# Patient Record
Sex: MALE | Race: WHITE
[De-identification: names, ages, dates, MRNs, and addresses within clinical notes are randomized per-mention and may not be internally consistent; named-entity substitution may affect disease eponyms.]

---

## 2018-04-16 ENCOUNTER — HOSPITAL ENCOUNTER (OUTPATIENT)
Age: 63
End: 2018-04-16
Payer: COMMERCIAL

## 2018-04-16 DIAGNOSIS — I10: Primary | ICD-10-CM

## 2018-04-16 DIAGNOSIS — E03.9: ICD-10-CM

## 2018-04-16 DIAGNOSIS — R25.2: ICD-10-CM

## 2018-04-16 LAB
ALANINE AMINOTRANSFER ALT/SGPT: 20 U/L (ref 16–61)
ALBUMIN SERPL-MCNC: 4 G/DL (ref 3.2–5)
ALKALINE PHOSPHATASE: 110 U/L (ref 45–117)
ANION GAP: 6 (ref 5–15)
AST(SGOT): 17 U/L (ref 15–37)
BUN SERPL-MCNC: 15 MG/DL (ref 7–18)
BUN/CREAT RATIO: 15 RATIO (ref 10–20)
CALCIUM SERPL-MCNC: 8.8 MG/DL (ref 8.5–10.1)
CARBON DIOXIDE: 27 MMOL/L (ref 21–32)
CHLORIDE: 108 MMOL/L (ref 98–107)
CHOLEST SERPL-MCNC: 97 MG/DL
EST GLOM FILT RATE - AFR AMER: 97 ML/MIN (ref 60–?)
GLOBULIN: 3.7 G/DL (ref 2.2–4.2)
GLUCOSE: 91 MG/DL (ref 74–106)
POTASSIUM: 4 MMOL/L (ref 3.5–5.1)
TRIGLYCERIDES: 126 MG/DL
VITAMIN D,25 HYDROXY: 21.6 NG/ML (ref 29.95–100.01)
VLDLC SERPL-MCNC: 25 MG/DL (ref 5–40)

## 2018-04-16 PROCEDURE — 36415 COLL VENOUS BLD VENIPUNCTURE: CPT

## 2018-04-16 PROCEDURE — 80061 LIPID PANEL: CPT

## 2018-04-16 PROCEDURE — 84443 ASSAY THYROID STIM HORMONE: CPT

## 2018-04-16 PROCEDURE — 80053 COMPREHEN METABOLIC PANEL: CPT

## 2018-04-16 PROCEDURE — 82306 VITAMIN D 25 HYDROXY: CPT

## 2019-02-20 ENCOUNTER — HOSPITAL ENCOUNTER (OUTPATIENT)
Age: 64
End: 2019-02-20
Payer: MEDICARE

## 2019-02-20 DIAGNOSIS — M79.662: Primary | ICD-10-CM

## 2019-02-20 PROCEDURE — 93971 EXTREMITY STUDY: CPT

## 2019-02-21 ENCOUNTER — HOSPITAL ENCOUNTER (OUTPATIENT)
Age: 64
End: 2019-02-21
Payer: MEDICARE

## 2019-02-21 DIAGNOSIS — Z01.818: Primary | ICD-10-CM

## 2019-02-21 DIAGNOSIS — I72.4: ICD-10-CM

## 2019-02-21 LAB
ANION GAP: 5 (ref 5–15)
BUN SERPL-MCNC: 15 MG/DL (ref 7–18)
BUN/CREAT RATIO: 12.8 RATIO (ref 10–20)
CALCIUM SERPL-MCNC: 9.3 MG/DL (ref 8.5–10.1)
CARBON DIOXIDE: 28 MMOL/L (ref 21–32)
CHLORIDE: 105 MMOL/L (ref 98–107)
DEPRECATED RDW RBC: 46.9 FL (ref 35.1–43.9)
ERYTHROCYTE [DISTWIDTH] IN BLOOD: 14.4 % (ref 11.6–14.6)
EST GLOM FILT RATE - AFR AMER: 80 ML/MIN (ref 60–?)
GLUCOSE: 111 MG/DL (ref 74–106)
HCT VFR BLD AUTO: 47 % (ref 40–54)
HEMOGLOBIN: 15.8 G/DL (ref 13–16.5)
HGB BLD-MCNC: 15.8 G/DL (ref 13–16.5)
MCV RBC: 89.7 FL (ref 80–94)
MEAN CORP HGB CONC: 33.6 G/GL (ref 32–36)
MEAN PLATELET VOL.: 11.3 FL (ref 6.2–12)
PLATELET # BLD: 189 K/MM3 (ref 150–450)
PLATELET COUNT: 189 K/MM3 (ref 150–450)
POTASSIUM: 5 MMOL/L (ref 3.5–5.1)
RBC # BLD AUTO: 5.24 M/MM3 (ref 4.6–6.2)
RBC DISTRIBUTION WIDTH CV: 14.4 % (ref 11.6–14.6)
RBC DISTRIBUTION WIDTH SD: 46.9 FL (ref 35.1–43.9)
SCAN INDICATED ON CBC? Y/N: NO
WBC # BLD AUTO: 11 K/MM3 (ref 4.4–11)
WHITE BLOOD COUNT: 11 K/MM3 (ref 4.4–11)

## 2019-02-21 PROCEDURE — 36415 COLL VENOUS BLD VENIPUNCTURE: CPT

## 2019-02-21 PROCEDURE — 80048 BASIC METABOLIC PNL TOTAL CA: CPT

## 2019-02-21 PROCEDURE — 85027 COMPLETE CBC AUTOMATED: CPT

## 2019-02-21 PROCEDURE — 75635 CT ANGIO ABDOMINAL ARTERIES: CPT

## 2019-02-22 ENCOUNTER — HOSPITAL ENCOUNTER (OUTPATIENT)
Age: 64
End: 2019-02-22
Payer: MEDICARE

## 2019-02-22 DIAGNOSIS — I72.4: ICD-10-CM

## 2019-02-22 DIAGNOSIS — Z01.818: Primary | ICD-10-CM

## 2019-02-22 PROCEDURE — 93970 EXTREMITY STUDY: CPT

## 2019-04-12 ENCOUNTER — HOSPITAL ENCOUNTER (EMERGENCY)
Age: 64
Discharge: HOME | End: 2019-04-12
Payer: MEDICARE

## 2019-04-12 VITALS
OXYGEN SATURATION: 97 % | DIASTOLIC BLOOD PRESSURE: 88 MMHG | RESPIRATION RATE: 16 BRPM | TEMPERATURE: 97.7 F | SYSTOLIC BLOOD PRESSURE: 159 MMHG | HEART RATE: 59 BPM

## 2019-04-12 VITALS
SYSTOLIC BLOOD PRESSURE: 140 MMHG | DIASTOLIC BLOOD PRESSURE: 80 MMHG | HEART RATE: 85 BPM | OXYGEN SATURATION: 97 % | RESPIRATION RATE: 16 BRPM

## 2019-04-12 VITALS — BODY MASS INDEX: 30.3 KG/M2

## 2019-04-12 DIAGNOSIS — E78.00: ICD-10-CM

## 2019-04-12 DIAGNOSIS — M79.642: Primary | ICD-10-CM

## 2019-04-12 DIAGNOSIS — M79.641: ICD-10-CM

## 2019-04-12 DIAGNOSIS — I10: ICD-10-CM

## 2019-04-12 DIAGNOSIS — Z87.891: ICD-10-CM

## 2019-04-12 DIAGNOSIS — F32.9: ICD-10-CM

## 2019-04-12 DIAGNOSIS — L40.9: ICD-10-CM

## 2019-04-12 PROCEDURE — 99283 EMERGENCY DEPT VISIT LOW MDM: CPT

## 2019-04-15 ENCOUNTER — HOSPITAL ENCOUNTER (OUTPATIENT)
Age: 64
End: 2019-04-15
Payer: MEDICARE

## 2019-04-15 VITALS — BODY MASS INDEX: 30.3 KG/M2

## 2019-04-15 DIAGNOSIS — I72.4: Primary | ICD-10-CM

## 2019-04-15 DIAGNOSIS — E03.9: ICD-10-CM

## 2019-04-15 DIAGNOSIS — E78.5: ICD-10-CM

## 2019-04-15 DIAGNOSIS — I10: ICD-10-CM

## 2019-04-15 LAB
ALANINE AMINOTRANSFER ALT/SGPT: 20 U/L (ref 16–61)
ANION GAP: 8 (ref 5–15)
AST(SGOT): 15 U/L (ref 15–37)
BUN SERPL-MCNC: 16 MG/DL (ref 7–18)
BUN/CREAT RATIO: 15.8 RATIO (ref 10–20)
CALCIUM SERPL-MCNC: 8.7 MG/DL (ref 8.5–10.1)
CARBON DIOXIDE: 26 MMOL/L (ref 21–32)
CHLORIDE: 105 MMOL/L (ref 98–107)
CHOLEST SERPL-MCNC: 123 MG/DL
EST GLOM FILT RATE - AFR AMER: 95 ML/MIN (ref 60–?)
GLUCOSE: 105 MG/DL (ref 74–106)
POTASSIUM: 4 MMOL/L (ref 3.5–5.1)
T4 SERPL-MCNC: 8.4 UG/DL (ref 4.5–12.1)
TRIGLYCERIDES: 104 MG/DL
VLDLC SERPL-MCNC: 21 MG/DL (ref 5–40)

## 2019-04-15 PROCEDURE — 84436 ASSAY OF TOTAL THYROXINE: CPT

## 2019-04-15 PROCEDURE — 80048 BASIC METABOLIC PNL TOTAL CA: CPT

## 2019-04-15 PROCEDURE — 84460 ALANINE AMINO (ALT) (SGPT): CPT

## 2019-04-15 PROCEDURE — 80061 LIPID PANEL: CPT

## 2019-04-15 PROCEDURE — 36415 COLL VENOUS BLD VENIPUNCTURE: CPT

## 2019-04-15 PROCEDURE — 84450 TRANSFERASE (AST) (SGOT): CPT

## 2019-04-15 PROCEDURE — 84443 ASSAY THYROID STIM HORMONE: CPT

## 2019-06-23 ENCOUNTER — HOSPITAL ENCOUNTER (EMERGENCY)
Age: 64
Discharge: HOME | End: 2019-06-23
Payer: MEDICARE

## 2019-06-23 VITALS
OXYGEN SATURATION: 94 % | SYSTOLIC BLOOD PRESSURE: 124 MMHG | DIASTOLIC BLOOD PRESSURE: 69 MMHG | HEART RATE: 66 BPM | RESPIRATION RATE: 18 BRPM

## 2019-06-23 VITALS
SYSTOLIC BLOOD PRESSURE: 124 MMHG | RESPIRATION RATE: 18 BRPM | DIASTOLIC BLOOD PRESSURE: 73 MMHG | OXYGEN SATURATION: 95 % | HEART RATE: 69 BPM | TEMPERATURE: 97.4 F

## 2019-06-23 VITALS — BODY MASS INDEX: 30.3 KG/M2 | BODY MASS INDEX: 30.6 KG/M2

## 2019-06-23 DIAGNOSIS — E78.00: ICD-10-CM

## 2019-06-23 DIAGNOSIS — M06.9: ICD-10-CM

## 2019-06-23 DIAGNOSIS — Z79.01: ICD-10-CM

## 2019-06-23 DIAGNOSIS — I10: ICD-10-CM

## 2019-06-23 DIAGNOSIS — M79.651: Primary | ICD-10-CM

## 2019-06-23 LAB
ANION GAP: 8 (ref 5–15)
BUN SERPL-MCNC: 13 MG/DL (ref 7–18)
BUN/CREAT RATIO: 11.5 RATIO (ref 10–20)
CALCIUM SERPL-MCNC: 9.1 MG/DL (ref 8.5–10.1)
CARBON DIOXIDE: 26 MMOL/L (ref 21–32)
CHLORIDE: 105 MMOL/L (ref 98–107)
DEPRECATED RDW RBC: 43.6 FL (ref 35.1–43.9)
DIFFERENTIAL INDICATED: (no result)
ERYTHROCYTE [DISTWIDTH] IN BLOOD: 14.4 % (ref 11.6–14.6)
EST GLOM FILT RATE - AFR AMER: 84 ML/MIN (ref 60–?)
ESTIMATED CREATININE CLEARANCE: 71.53 ML/MIN
GLUCOSE: 120 MG/DL (ref 74–106)
HCT VFR BLD AUTO: 42.2 % (ref 40–54)
HEMOGLOBIN: 14.7 G/DL (ref 13–16.5)
HGB BLD-MCNC: 14.7 G/DL (ref 13–16.5)
IMMATURE GRANULOCYTES COUNT: 0.02 X10^3/UL (ref 0–0)
MCV RBC: 84.7 FL (ref 80–94)
MEAN CORP HGB CONC: 34.8 G/GL (ref 32–36)
MEAN PLATELET VOL.: 10.6 FL (ref 6.2–12)
PLATELET # BLD: 179 K/MM3 (ref 150–450)
PLATELET COUNT: 179 K/MM3 (ref 150–450)
POSITIVE COUNT: NO
POSITIVE DIFFERENTIAL: NO
POSITIVE MORPHOLOGY: NO
POTASSIUM: 4.1 MMOL/L (ref 3.5–5.1)
PROTHROMBIN TIME (PROTIME)PT.: 27.7 SECONDS (ref 11.7–14.9)
RBC # BLD AUTO: 4.98 M/MM3 (ref 4.6–6.2)
RBC DISTRIBUTION WIDTH CV: 14.4 % (ref 11.6–14.6)
RBC DISTRIBUTION WIDTH SD: 43.6 FL (ref 35.1–43.9)
WBC # BLD AUTO: 10.1 K/MM3 (ref 4.4–11)
WHITE BLOOD COUNT: 10.1 K/MM3 (ref 4.4–11)

## 2019-06-23 PROCEDURE — 73706 CT ANGIO LWR EXTR W/O&W/DYE: CPT

## 2019-06-23 PROCEDURE — A4216 STERILE WATER/SALINE, 10 ML: HCPCS

## 2019-06-23 PROCEDURE — 73552 X-RAY EXAM OF FEMUR 2/>: CPT

## 2019-06-23 PROCEDURE — 96372 THER/PROPH/DIAG INJ SC/IM: CPT

## 2019-06-23 PROCEDURE — 96374 THER/PROPH/DIAG INJ IV PUSH: CPT

## 2019-06-23 PROCEDURE — 85025 COMPLETE CBC W/AUTO DIFF WBC: CPT

## 2019-06-23 PROCEDURE — 72170 X-RAY EXAM OF PELVIS: CPT

## 2019-06-23 PROCEDURE — 99283 EMERGENCY DEPT VISIT LOW MDM: CPT

## 2019-06-23 PROCEDURE — 96375 TX/PRO/DX INJ NEW DRUG ADDON: CPT

## 2019-06-23 PROCEDURE — 80048 BASIC METABOLIC PNL TOTAL CA: CPT

## 2019-06-23 PROCEDURE — 85610 PROTHROMBIN TIME: CPT

## 2019-08-14 ENCOUNTER — HOSPITAL ENCOUNTER (OUTPATIENT)
Age: 64
End: 2019-08-14
Payer: MEDICARE

## 2019-08-14 VITALS — BODY MASS INDEX: 30.6 KG/M2

## 2019-08-14 DIAGNOSIS — M79.7: ICD-10-CM

## 2019-08-14 DIAGNOSIS — L40.8: ICD-10-CM

## 2019-08-14 DIAGNOSIS — L40.59: Primary | ICD-10-CM

## 2019-08-14 LAB
ALANINE AMINOTRANSFER ALT/SGPT: 24 U/L (ref 16–61)
ALBUMIN SERPL-MCNC: 3.8 G/DL (ref 3.2–5)
ALKALINE PHOSPHATASE: 86 U/L (ref 45–117)
ANION GAP: 6 (ref 5–15)
AST(SGOT): 19 U/L (ref 15–37)
BUN SERPL-MCNC: 18 MG/DL (ref 7–18)
BUN/CREAT RATIO: 16.4 RATIO (ref 10–20)
CALCIUM SERPL-MCNC: 9.2 MG/DL (ref 8.5–10.1)
CARBON DIOXIDE: 30 MMOL/L (ref 21–32)
CHLORIDE: 102 MMOL/L (ref 98–107)
CRP SERPL-MCNC: < 2.9 MG/L (ref 0–3)
DEPRECATED RDW RBC: 48.1 FL (ref 35.1–43.9)
ERYTHROCYTE [DISTWIDTH] IN BLOOD: 14.6 % (ref 11.6–14.6)
EST GLOM FILT RATE - AFR AMER: 86 ML/MIN (ref 60–?)
GLOBULIN: 3.5 G/DL (ref 2.2–4.2)
GLUCOSE: 129 MG/DL (ref 74–106)
HCT VFR BLD AUTO: 43.4 % (ref 40–54)
HEMOGLOBIN: 14.4 G/DL (ref 13–16.5)
HGB BLD-MCNC: 14.4 G/DL (ref 13–16.5)
IMMATURE GRANULOCYTES COUNT: 0.08 X10^3/UL (ref 0–0)
MCV RBC: 89.9 FL (ref 80–94)
MEAN CORP HGB CONC: 33.2 G/DL (ref 32–36)
MEAN PLATELET VOL.: 11.3 FL (ref 6.2–12)
NRBC FLAGGED BY ANALYZER: 0 % (ref 0–5)
PLATELET # BLD: 168 K/MM3 (ref 150–450)
PLATELET COUNT: 168 K/MM3 (ref 150–450)
POTASSIUM: 4.1 MMOL/L (ref 3.5–5.1)
RBC # BLD AUTO: 4.83 M/MM3 (ref 4.6–6.2)
RBC DISTRIBUTION WIDTH CV: 14.6 % (ref 11.6–14.6)
RBC DISTRIBUTION WIDTH SD: 48.1 FL (ref 35.1–43.9)
WBC # BLD AUTO: 11 K/MM3 (ref 4.4–11)
WHITE BLOOD COUNT: 11 K/MM3 (ref 4.4–11)

## 2019-08-14 PROCEDURE — 72170 X-RAY EXAM OF PELVIS: CPT

## 2019-08-14 PROCEDURE — 80053 COMPREHEN METABOLIC PANEL: CPT

## 2019-08-14 PROCEDURE — 86706 HEP B SURFACE ANTIBODY: CPT

## 2019-08-14 PROCEDURE — 86803 HEPATITIS C AB TEST: CPT

## 2019-08-14 PROCEDURE — 85025 COMPLETE CBC W/AUTO DIFF WBC: CPT

## 2019-08-14 PROCEDURE — 85652 RBC SED RATE AUTOMATED: CPT

## 2019-08-14 PROCEDURE — 36415 COLL VENOUS BLD VENIPUNCTURE: CPT

## 2019-08-14 PROCEDURE — 87340 HEPATITIS B SURFACE AG IA: CPT

## 2019-08-14 PROCEDURE — 86200 CCP ANTIBODY: CPT

## 2019-08-14 PROCEDURE — 86140 C-REACTIVE PROTEIN: CPT

## 2019-08-14 PROCEDURE — 86038 ANTINUCLEAR ANTIBODIES: CPT

## 2019-08-14 PROCEDURE — 81374 HLA I TYPING 1 ANTIGEN LR: CPT

## 2019-08-14 PROCEDURE — 86431 RHEUMATOID FACTOR QUANT: CPT

## 2019-08-23 ENCOUNTER — HOSPITAL ENCOUNTER (OUTPATIENT)
Age: 64
End: 2019-08-23
Payer: MEDICARE

## 2019-08-23 VITALS — BODY MASS INDEX: 30.6 KG/M2

## 2019-08-23 DIAGNOSIS — E03.9: ICD-10-CM

## 2019-08-23 DIAGNOSIS — E78.5: ICD-10-CM

## 2019-08-23 DIAGNOSIS — F32.89: ICD-10-CM

## 2019-08-23 DIAGNOSIS — L40.59: Primary | ICD-10-CM

## 2019-08-23 DIAGNOSIS — L40.8: ICD-10-CM

## 2019-08-23 DIAGNOSIS — M79.7: ICD-10-CM

## 2019-08-23 DIAGNOSIS — M21.40: ICD-10-CM

## 2019-08-23 DIAGNOSIS — I10: ICD-10-CM

## 2019-08-23 PROCEDURE — 76705 ECHO EXAM OF ABDOMEN: CPT

## 2019-10-22 ENCOUNTER — HOSPITAL ENCOUNTER (OUTPATIENT)
Age: 64
End: 2019-10-22
Payer: MEDICARE

## 2019-10-22 VITALS — BODY MASS INDEX: 30.6 KG/M2

## 2019-10-22 DIAGNOSIS — L40.59: Primary | ICD-10-CM

## 2019-10-22 DIAGNOSIS — E78.5: ICD-10-CM

## 2019-10-22 DIAGNOSIS — M21.40: ICD-10-CM

## 2019-10-22 DIAGNOSIS — I10: ICD-10-CM

## 2019-10-22 DIAGNOSIS — F32.89: ICD-10-CM

## 2019-10-22 DIAGNOSIS — E03.9: ICD-10-CM

## 2019-10-22 DIAGNOSIS — M79.7: ICD-10-CM

## 2019-10-22 DIAGNOSIS — L40.8: ICD-10-CM

## 2019-10-22 LAB
ALANINE AMINOTRANSFER ALT/SGPT: 22 U/L (ref 16–61)
ALBUMIN SERPL-MCNC: 4.2 G/DL (ref 3.2–5)
ALKALINE PHOSPHATASE: 79 U/L (ref 45–117)
ANION GAP: 4 (ref 5–15)
AST(SGOT): 15 U/L (ref 15–37)
BUN SERPL-MCNC: 19 MG/DL (ref 7–18)
BUN/CREAT RATIO: 18.8 RATIO (ref 10–20)
CALCIUM SERPL-MCNC: 8.9 MG/DL (ref 8.5–10.1)
CARBON DIOXIDE: 28 MMOL/L (ref 21–32)
CHLORIDE: 106 MMOL/L (ref 98–107)
DEPRECATED RDW RBC: 53.7 FL (ref 35.1–43.9)
ERYTHROCYTE [DISTWIDTH] IN BLOOD: 15.8 % (ref 11.6–14.6)
EST GLOM FILT RATE - AFR AMER: 95 ML/MIN (ref 60–?)
GLOBULIN: 3.2 G/DL (ref 2.2–4.2)
GLUCOSE: 125 MG/DL (ref 74–106)
HCT VFR BLD AUTO: 41.2 % (ref 40–54)
HEMOGLOBIN: 13.7 G/DL (ref 13–16.5)
HGB BLD-MCNC: 13.7 G/DL (ref 13–16.5)
IMMATURE GRANULOCYTES COUNT: 0.05 X10^3/UL (ref 0–0)
MCV RBC: 93 FL (ref 80–94)
MEAN CORP HGB CONC: 33.3 G/DL (ref 32–36)
MEAN PLATELET VOL.: 10.9 FL (ref 6.2–12)
NRBC FLAGGED BY ANALYZER: 0 % (ref 0–5)
PLATELET # BLD: 183 K/MM3 (ref 150–450)
PLATELET COUNT: 183 K/MM3 (ref 150–450)
POTASSIUM: 4.2 MMOL/L (ref 3.5–5.1)
RBC # BLD AUTO: 4.43 M/MM3 (ref 4.6–6.2)
RBC DISTRIBUTION WIDTH CV: 15.8 % (ref 11.6–14.6)
RBC DISTRIBUTION WIDTH SD: 53.7 FL (ref 35.1–43.9)
WBC # BLD AUTO: 10.4 K/MM3 (ref 4.4–11)
WHITE BLOOD COUNT: 10.4 K/MM3 (ref 4.4–11)

## 2019-10-22 PROCEDURE — 80053 COMPREHEN METABOLIC PANEL: CPT

## 2019-10-22 PROCEDURE — 85025 COMPLETE CBC W/AUTO DIFF WBC: CPT

## 2019-10-22 PROCEDURE — 36415 COLL VENOUS BLD VENIPUNCTURE: CPT

## 2020-01-21 ENCOUNTER — HOSPITAL ENCOUNTER (OUTPATIENT)
Age: 65
End: 2020-01-21
Payer: MEDICARE

## 2020-01-21 VITALS — BODY MASS INDEX: 30.6 KG/M2

## 2020-01-21 DIAGNOSIS — L40.8: ICD-10-CM

## 2020-01-21 DIAGNOSIS — E03.9: ICD-10-CM

## 2020-01-21 DIAGNOSIS — F32.89: ICD-10-CM

## 2020-01-21 DIAGNOSIS — I10: ICD-10-CM

## 2020-01-21 DIAGNOSIS — Z79.899: ICD-10-CM

## 2020-01-21 DIAGNOSIS — M79.7: ICD-10-CM

## 2020-01-21 DIAGNOSIS — M21.40: ICD-10-CM

## 2020-01-21 DIAGNOSIS — L40.59: Primary | ICD-10-CM

## 2020-01-21 DIAGNOSIS — E78.5: ICD-10-CM

## 2020-01-21 LAB
ALANINE AMINOTRANSFER ALT/SGPT: 27 U/L (ref 16–61)
ALBUMIN SERPL-MCNC: 4 G/DL (ref 3.2–5)
ALKALINE PHOSPHATASE: 89 U/L (ref 45–117)
ANION GAP: 3 (ref 5–15)
AST(SGOT): 15 U/L (ref 15–37)
BUN SERPL-MCNC: 15 MG/DL (ref 7–18)
BUN/CREAT RATIO: 13.6 RATIO (ref 10–20)
CALCIUM SERPL-MCNC: 9.1 MG/DL (ref 8.5–10.1)
CARBON DIOXIDE: 29 MMOL/L (ref 21–32)
CHLORIDE: 106 MMOL/L (ref 98–107)
DEPRECATED RDW RBC: 48.8 FL (ref 35.1–43.9)
ERYTHROCYTE [DISTWIDTH] IN BLOOD: 14.5 % (ref 11.6–14.6)
EST GLOM FILT RATE - AFR AMER: 86 ML/MIN (ref 60–?)
GLOBULIN: 3.7 G/DL (ref 2.2–4.2)
GLUCOSE: 144 MG/DL (ref 74–106)
HCT VFR BLD AUTO: 44.5 % (ref 40–54)
HEMOGLOBIN: 14.6 G/DL (ref 13–16.5)
HGB BLD-MCNC: 14.6 G/DL (ref 13–16.5)
IMMATURE GRANULOCYTES COUNT: 0.03 X10^3/UL (ref 0–0)
MCV RBC: 92.3 FL (ref 80–94)
MEAN CORP HGB CONC: 32.8 G/DL (ref 32–36)
MEAN PLATELET VOL.: 10.7 FL (ref 6.2–12)
NRBC FLAGGED BY ANALYZER: 0 % (ref 0–5)
PLATELET # BLD: 195 K/MM3 (ref 150–450)
PLATELET COUNT: 195 K/MM3 (ref 150–450)
POTASSIUM: 4 MMOL/L (ref 3.5–5.1)
RBC # BLD AUTO: 4.82 M/MM3 (ref 4.6–6.2)
RBC DISTRIBUTION WIDTH CV: 14.5 % (ref 11.6–14.6)
RBC DISTRIBUTION WIDTH SD: 48.8 FL (ref 35.1–43.9)
WBC # BLD AUTO: 8.1 K/MM3 (ref 4.4–11)
WHITE BLOOD COUNT: 8.1 K/MM3 (ref 4.4–11)

## 2020-01-21 PROCEDURE — 85025 COMPLETE CBC W/AUTO DIFF WBC: CPT

## 2020-01-21 PROCEDURE — 80053 COMPREHEN METABOLIC PANEL: CPT

## 2020-01-21 PROCEDURE — 36415 COLL VENOUS BLD VENIPUNCTURE: CPT

## 2020-04-21 ENCOUNTER — HOSPITAL ENCOUNTER (OUTPATIENT)
Dept: HOSPITAL 100 - MTLAB | Age: 65
Discharge: HOME | End: 2020-04-21
Payer: MEDICARE

## 2020-04-21 VITALS — BODY MASS INDEX: 30.6 KG/M2

## 2020-04-21 DIAGNOSIS — M21.40: ICD-10-CM

## 2020-04-21 DIAGNOSIS — E78.5: ICD-10-CM

## 2020-04-21 DIAGNOSIS — E03.9: ICD-10-CM

## 2020-04-21 DIAGNOSIS — I10: ICD-10-CM

## 2020-04-21 DIAGNOSIS — L40.59: Primary | ICD-10-CM

## 2020-04-21 DIAGNOSIS — M79.7: ICD-10-CM

## 2020-04-21 DIAGNOSIS — Z79.899: ICD-10-CM

## 2020-04-21 DIAGNOSIS — L40.8: ICD-10-CM

## 2020-04-21 DIAGNOSIS — F32.89: ICD-10-CM

## 2020-04-21 LAB
ALANINE AMINOTRANSFER ALT/SGPT: 23 U/L (ref 16–61)
ALBUMIN SERPL-MCNC: 3.9 G/DL (ref 3.2–5)
ALKALINE PHOSPHATASE: 86 U/L (ref 45–117)
ANION GAP: 6 (ref 5–15)
AST(SGOT): 19 U/L (ref 15–37)
BUN SERPL-MCNC: 13 MG/DL (ref 7–18)
BUN/CREAT RATIO: 12.1 RATIO (ref 10–20)
CALCIUM SERPL-MCNC: 9 MG/DL (ref 8.5–10.1)
CARBON DIOXIDE: 25 MMOL/L (ref 21–32)
CHLORIDE: 109 MMOL/L (ref 98–107)
DEPRECATED RDW RBC: 51.4 FL (ref 35.1–43.9)
ERYTHROCYTE [DISTWIDTH] IN BLOOD: 15 % (ref 11.6–14.6)
EST GLOM FILT RATE - AFR AMER: 89 ML/MIN (ref 60–?)
GLOBULIN: 3.3 G/DL (ref 2.2–4.2)
GLUCOSE: 137 MG/DL (ref 74–106)
HCT VFR BLD AUTO: 40.3 % (ref 40–54)
HEMOGLOBIN: 13.4 G/DL (ref 13–16.5)
HGB BLD-MCNC: 13.4 G/DL (ref 13–16.5)
IMMATURE GRANULOCYTES COUNT: 0.05 X10^3/UL (ref 0–0)
MCV RBC: 93.5 FL (ref 80–94)
MEAN CORP HGB CONC: 33.3 G/DL (ref 32–36)
MEAN PLATELET VOL.: 11.2 FL (ref 6.2–12)
NRBC FLAGGED BY ANALYZER: 0 % (ref 0–5)
PLATELET # BLD: 180 K/MM3 (ref 150–450)
PLATELET COUNT: 180 K/MM3 (ref 150–450)
POTASSIUM: 4 MMOL/L (ref 3.5–5.1)
RBC # BLD AUTO: 4.31 M/MM3 (ref 4.6–6.2)
RBC DISTRIBUTION WIDTH CV: 15 % (ref 11.6–14.6)
RBC DISTRIBUTION WIDTH SD: 51.4 FL (ref 35.1–43.9)
WBC # BLD AUTO: 8.8 K/MM3 (ref 4.4–11)
WHITE BLOOD COUNT: 8.8 K/MM3 (ref 4.4–11)

## 2020-04-21 PROCEDURE — 85025 COMPLETE CBC W/AUTO DIFF WBC: CPT

## 2020-04-21 PROCEDURE — 80053 COMPREHEN METABOLIC PANEL: CPT

## 2020-04-21 PROCEDURE — 36415 COLL VENOUS BLD VENIPUNCTURE: CPT

## 2020-07-02 ENCOUNTER — HOSPITAL ENCOUNTER (OUTPATIENT)
Dept: HOSPITAL 100 - MTRAD | Age: 65
Discharge: HOME | End: 2020-07-02
Payer: MEDICARE

## 2020-07-02 VITALS — BODY MASS INDEX: 30.6 KG/M2

## 2020-07-02 DIAGNOSIS — S89.91XA: Primary | ICD-10-CM

## 2020-07-02 PROCEDURE — 73590 X-RAY EXAM OF LOWER LEG: CPT

## 2020-07-17 ENCOUNTER — HOSPITAL ENCOUNTER (OUTPATIENT)
Dept: HOSPITAL 100 - MTLAB | Age: 65
Discharge: HOME | End: 2020-07-17
Payer: MEDICARE

## 2020-07-17 VITALS — BODY MASS INDEX: 30.6 KG/M2

## 2020-07-17 DIAGNOSIS — L40.59: Primary | ICD-10-CM

## 2020-07-17 DIAGNOSIS — I10: ICD-10-CM

## 2020-07-17 DIAGNOSIS — E78.5: ICD-10-CM

## 2020-07-17 DIAGNOSIS — E03.9: ICD-10-CM

## 2020-07-17 DIAGNOSIS — M79.7: ICD-10-CM

## 2020-07-17 DIAGNOSIS — L40.8: ICD-10-CM

## 2020-07-17 DIAGNOSIS — M21.40: ICD-10-CM

## 2020-07-17 DIAGNOSIS — Z79.899: ICD-10-CM

## 2020-07-17 DIAGNOSIS — F32.89: ICD-10-CM

## 2020-07-17 LAB
ALANINE AMINOTRANSFER ALT/SGPT: 33 U/L (ref 16–61)
ALBUMIN SERPL-MCNC: 4 G/DL (ref 3.2–5)
ALKALINE PHOSPHATASE: 95 U/L (ref 45–117)
ANION GAP: 4 (ref 5–15)
AST(SGOT): 32 U/L (ref 15–37)
BUN SERPL-MCNC: 14 MG/DL (ref 7–18)
BUN/CREAT RATIO: 13.1 RATIO (ref 10–20)
CALCIUM SERPL-MCNC: 8.8 MG/DL (ref 8.5–10.1)
CARBON DIOXIDE: 28 MMOL/L (ref 21–32)
CHLORIDE: 107 MMOL/L (ref 98–107)
DEPRECATED RDW RBC: 47.8 FL (ref 35.1–43.9)
ERYTHROCYTE [DISTWIDTH] IN BLOOD: 13.8 % (ref 11.6–14.6)
EST GLOM FILT RATE - AFR AMER: 89 ML/MIN (ref 60–?)
GLOBULIN: 3.5 G/DL (ref 2.2–4.2)
GLUCOSE: 98 MG/DL (ref 74–106)
HCT VFR BLD AUTO: 40.2 % (ref 40–54)
HEMOGLOBIN: 13 G/DL (ref 13–16.5)
HGB BLD-MCNC: 13 G/DL (ref 13–16.5)
IMMATURE GRANULOCYTES COUNT: 0.02 X10^3/UL (ref 0–0)
MCV RBC: 94.8 FL (ref 80–94)
MEAN CORP HGB CONC: 32.3 G/DL (ref 32–36)
MEAN PLATELET VOL.: 11.2 FL (ref 6.2–12)
NRBC FLAGGED BY ANALYZER: 0 % (ref 0–5)
PLATELET # BLD: 220 K/MM3 (ref 150–450)
PLATELET COUNT: 220 K/MM3 (ref 150–450)
POTASSIUM: 3.7 MMOL/L (ref 3.5–5.1)
RBC # BLD AUTO: 4.24 M/MM3 (ref 4.6–6.2)
RBC DISTRIBUTION WIDTH CV: 13.8 % (ref 11.6–14.6)
RBC DISTRIBUTION WIDTH SD: 47.8 FL (ref 35.1–43.9)
WBC # BLD AUTO: 7.7 K/MM3 (ref 4.4–11)
WHITE BLOOD COUNT: 7.7 K/MM3 (ref 4.4–11)

## 2020-07-17 PROCEDURE — 36415 COLL VENOUS BLD VENIPUNCTURE: CPT

## 2020-07-17 PROCEDURE — 85025 COMPLETE CBC W/AUTO DIFF WBC: CPT

## 2020-07-17 PROCEDURE — 80053 COMPREHEN METABOLIC PANEL: CPT

## 2020-10-19 ENCOUNTER — HOSPITAL ENCOUNTER (OUTPATIENT)
Dept: HOSPITAL 100 - MFPLAB | Age: 65
Discharge: HOME | End: 2020-10-19
Payer: MEDICARE

## 2020-10-19 VITALS — BODY MASS INDEX: 30.6 KG/M2

## 2020-10-19 DIAGNOSIS — I10: ICD-10-CM

## 2020-10-19 DIAGNOSIS — L40.8: ICD-10-CM

## 2020-10-19 DIAGNOSIS — E78.5: ICD-10-CM

## 2020-10-19 DIAGNOSIS — M21.40: ICD-10-CM

## 2020-10-19 DIAGNOSIS — E03.9: ICD-10-CM

## 2020-10-19 DIAGNOSIS — M79.7: ICD-10-CM

## 2020-10-19 DIAGNOSIS — Z79.899: ICD-10-CM

## 2020-10-19 DIAGNOSIS — F32.89: ICD-10-CM

## 2020-10-19 DIAGNOSIS — L40.59: Primary | ICD-10-CM

## 2020-10-19 LAB
ALANINE AMINOTRANSFER ALT/SGPT: 55 U/L (ref 16–61)
ALBUMIN SERPL-MCNC: 4.1 G/DL (ref 3.2–5)
ALKALINE PHOSPHATASE: 94 U/L (ref 45–117)
ANION GAP: 7 (ref 5–15)
AST(SGOT): 28 U/L (ref 15–37)
BUN SERPL-MCNC: 17 MG/DL (ref 7–18)
BUN/CREAT RATIO: 14.5 RATIO (ref 10–20)
CALCIUM SERPL-MCNC: 8.9 MG/DL (ref 8.5–10.1)
CARBON DIOXIDE: 29 MMOL/L (ref 21–32)
CHLORIDE: 104 MMOL/L (ref 98–107)
DEPRECATED RDW RBC: 51.3 FL (ref 35.1–43.9)
ERYTHROCYTE [DISTWIDTH] IN BLOOD: 14.9 % (ref 11.6–14.6)
EST GLOM FILT RATE - AFR AMER: 80 ML/MIN (ref 60–?)
GLOBULIN: 3.5 G/DL (ref 2.2–4.2)
GLUCOSE: 111 MG/DL (ref 74–106)
HCT VFR BLD AUTO: 43.6 % (ref 40–54)
HEMOGLOBIN: 14.1 G/DL (ref 13–16.5)
HGB BLD-MCNC: 14.1 G/DL (ref 13–16.5)
IMMATURE GRANULOCYTES COUNT: 0.02 X10^3/UL (ref 0–0)
MCV RBC: 94.8 FL (ref 80–94)
MEAN CORP HGB CONC: 32.3 G/DL (ref 32–36)
MEAN PLATELET VOL.: 11.5 FL (ref 6.2–12)
NRBC FLAGGED BY ANALYZER: 0 % (ref 0–5)
PLATELET # BLD: 196 K/MM3 (ref 150–450)
PLATELET COUNT: 196 K/MM3 (ref 150–450)
POTASSIUM: 4.1 MMOL/L (ref 3.5–5.1)
RBC # BLD AUTO: 4.6 M/MM3 (ref 4.6–6.2)
RBC DISTRIBUTION WIDTH CV: 14.9 % (ref 11.6–14.6)
RBC DISTRIBUTION WIDTH SD: 51.3 FL (ref 35.1–43.9)
WBC # BLD AUTO: 7.9 K/MM3 (ref 4.4–11)
WHITE BLOOD COUNT: 7.9 K/MM3 (ref 4.4–11)

## 2020-10-19 PROCEDURE — 80053 COMPREHEN METABOLIC PANEL: CPT

## 2020-10-19 PROCEDURE — 36415 COLL VENOUS BLD VENIPUNCTURE: CPT

## 2020-10-19 PROCEDURE — 85025 COMPLETE CBC W/AUTO DIFF WBC: CPT

## 2020-10-27 ENCOUNTER — HOSPITAL ENCOUNTER (OUTPATIENT)
Dept: HOSPITAL 100 - MFPLAB | Age: 65
Discharge: HOME | End: 2020-10-27
Payer: MEDICARE

## 2020-10-27 VITALS — BODY MASS INDEX: 30.6 KG/M2

## 2020-10-27 DIAGNOSIS — Z12.5: ICD-10-CM

## 2020-10-27 DIAGNOSIS — E78.00: ICD-10-CM

## 2020-10-27 DIAGNOSIS — E03.9: ICD-10-CM

## 2020-10-27 DIAGNOSIS — Z00.00: Primary | ICD-10-CM

## 2020-10-27 LAB
CHOLEST SERPL-MCNC: 115 MG/DL
PSA,TOTAL - ANNUAL SCREEN: 0.72 NG/ML (ref 0–4)
T4 SERPL-MCNC: 8.6 UG/DL (ref 4.5–12.1)
TRIGLYCERIDES: 130 MG/DL
VLDLC SERPL-MCNC: 26 MG/DL (ref 5–40)

## 2020-10-27 PROCEDURE — 80061 LIPID PANEL: CPT

## 2020-10-27 PROCEDURE — 84443 ASSAY THYROID STIM HORMONE: CPT

## 2020-10-27 PROCEDURE — G0103 PSA SCREENING: HCPCS

## 2020-10-27 PROCEDURE — 84153 ASSAY OF PSA TOTAL: CPT

## 2020-10-27 PROCEDURE — 84436 ASSAY OF TOTAL THYROXINE: CPT

## 2020-10-27 PROCEDURE — 36415 COLL VENOUS BLD VENIPUNCTURE: CPT

## 2020-12-28 ENCOUNTER — HOSPITAL ENCOUNTER (OUTPATIENT)
Age: 65
End: 2020-12-28
Payer: MEDICARE

## 2020-12-28 VITALS — BODY MASS INDEX: 30.6 KG/M2

## 2020-12-28 DIAGNOSIS — E78.5: ICD-10-CM

## 2020-12-28 DIAGNOSIS — M21.40: ICD-10-CM

## 2020-12-28 DIAGNOSIS — E03.9: ICD-10-CM

## 2020-12-28 DIAGNOSIS — Z79.899: ICD-10-CM

## 2020-12-28 DIAGNOSIS — L40.59: Primary | ICD-10-CM

## 2020-12-28 DIAGNOSIS — I10: ICD-10-CM

## 2020-12-28 DIAGNOSIS — F32.89: ICD-10-CM

## 2020-12-28 DIAGNOSIS — L40.8: ICD-10-CM

## 2020-12-28 DIAGNOSIS — M79.7: ICD-10-CM

## 2020-12-28 LAB
ALANINE AMINOTRANSFER ALT/SGPT: 129 U/L (ref 16–61)
ALBUMIN SERPL-MCNC: 4 G/DL (ref 3.2–5)
ALKALINE PHOSPHATASE: 100 U/L (ref 45–117)
ANION GAP: 5 (ref 5–15)
AST(SGOT): 73 U/L (ref 15–37)
BUN SERPL-MCNC: 10 MG/DL (ref 7–18)
BUN/CREAT RATIO: 10.3 RATIO (ref 10–20)
CALCIUM SERPL-MCNC: 8.9 MG/DL (ref 8.5–10.1)
CARBON DIOXIDE: 29 MMOL/L (ref 21–32)
CHLORIDE: 105 MMOL/L (ref 98–107)
DEPRECATED RDW RBC: 50.9 FL (ref 35.1–43.9)
ERYTHROCYTE [DISTWIDTH] IN BLOOD: 15 % (ref 11.6–14.6)
EST GLOM FILT RATE - AFR AMER: 99 ML/MIN (ref 60–?)
GLOBULIN: 3.1 G/DL (ref 2.2–4.2)
GLUCOSE: 97 MG/DL (ref 74–106)
HCT VFR BLD AUTO: 41.5 % (ref 40–54)
HEMOGLOBIN: 13.4 G/DL (ref 13–16.5)
HGB BLD-MCNC: 13.4 G/DL (ref 13–16.5)
IMMATURE GRANULOCYTES COUNT: 0.03 X10^3/UL (ref 0–0)
MCV RBC: 93.9 FL (ref 80–94)
MEAN CORP HGB CONC: 32.3 G/DL (ref 32–36)
MEAN PLATELET VOL.: 11.1 FL (ref 6.2–12)
NRBC FLAGGED BY ANALYZER: 0 % (ref 0–5)
PLATELET # BLD: 192 K/MM3 (ref 150–450)
PLATELET COUNT: 192 K/MM3 (ref 150–450)
POTASSIUM: 4.2 MMOL/L (ref 3.5–5.1)
RBC # BLD AUTO: 4.42 M/MM3 (ref 4.6–6.2)
RBC DISTRIBUTION WIDTH CV: 15 % (ref 11.6–14.6)
RBC DISTRIBUTION WIDTH SD: 50.9 FL (ref 35.1–43.9)
WBC # BLD AUTO: 7.3 K/MM3 (ref 4.4–11)
WHITE BLOOD COUNT: 7.3 K/MM3 (ref 4.4–11)

## 2020-12-28 PROCEDURE — 80053 COMPREHEN METABOLIC PANEL: CPT

## 2020-12-28 PROCEDURE — 36415 COLL VENOUS BLD VENIPUNCTURE: CPT

## 2020-12-28 PROCEDURE — 85025 COMPLETE CBC W/AUTO DIFF WBC: CPT

## 2021-02-10 ENCOUNTER — HOSPITAL ENCOUNTER (OUTPATIENT)
Age: 66
End: 2021-02-10
Payer: MEDICARE

## 2021-02-10 VITALS — BODY MASS INDEX: 30.6 KG/M2

## 2021-02-10 DIAGNOSIS — L40.59: Primary | ICD-10-CM

## 2021-02-10 DIAGNOSIS — F32.89: ICD-10-CM

## 2021-02-10 DIAGNOSIS — M21.40: ICD-10-CM

## 2021-02-10 DIAGNOSIS — Z79.899: ICD-10-CM

## 2021-02-10 DIAGNOSIS — M79.7: ICD-10-CM

## 2021-02-10 DIAGNOSIS — E03.9: ICD-10-CM

## 2021-02-10 DIAGNOSIS — L40.8: ICD-10-CM

## 2021-02-10 DIAGNOSIS — E78.5: ICD-10-CM

## 2021-02-10 DIAGNOSIS — I10: ICD-10-CM

## 2021-02-10 LAB
ALANINE AMINOTRANSFER ALT/SGPT: 19 U/L (ref 16–61)
ALBUMIN SERPL-MCNC: 3.8 G/DL (ref 3.2–5)
ALKALINE PHOSPHATASE: 110 U/L (ref 45–117)
ANION GAP: 5 (ref 5–15)
AST(SGOT): 16 U/L (ref 15–37)
BUN SERPL-MCNC: 13 MG/DL (ref 7–18)
BUN/CREAT RATIO: 12.1 RATIO (ref 10–20)
CALCIUM SERPL-MCNC: 9.3 MG/DL (ref 8.5–10.1)
CARBON DIOXIDE: 27 MMOL/L (ref 21–32)
CHLORIDE: 106 MMOL/L (ref 98–107)
EST GLOM FILT RATE - AFR AMER: 89 ML/MIN (ref 60–?)
GLOBULIN: 3.9 G/DL (ref 2.2–4.2)
GLUCOSE: 133 MG/DL (ref 74–106)
POTASSIUM: 3.7 MMOL/L (ref 3.5–5.1)

## 2021-02-10 PROCEDURE — 36415 COLL VENOUS BLD VENIPUNCTURE: CPT

## 2021-02-10 PROCEDURE — 80053 COMPREHEN METABOLIC PANEL: CPT

## 2021-03-08 ENCOUNTER — HOSPITAL ENCOUNTER (OUTPATIENT)
Age: 66
End: 2021-03-08
Payer: MEDICARE

## 2021-03-08 VITALS — BODY MASS INDEX: 30.6 KG/M2

## 2021-03-08 DIAGNOSIS — Z79.899: ICD-10-CM

## 2021-03-08 DIAGNOSIS — F32.89: ICD-10-CM

## 2021-03-08 DIAGNOSIS — L40.8: ICD-10-CM

## 2021-03-08 DIAGNOSIS — M21.40: ICD-10-CM

## 2021-03-08 DIAGNOSIS — I10: ICD-10-CM

## 2021-03-08 DIAGNOSIS — L40.59: Primary | ICD-10-CM

## 2021-03-08 DIAGNOSIS — E03.9: ICD-10-CM

## 2021-03-08 DIAGNOSIS — E78.5: ICD-10-CM

## 2021-03-08 DIAGNOSIS — M79.7: ICD-10-CM

## 2021-03-08 LAB
ALANINE AMINOTRANSFER ALT/SGPT: 45 U/L (ref 16–61)
ALBUMIN SERPL-MCNC: 4.2 G/DL (ref 3.2–5)
ALKALINE PHOSPHATASE: 116 U/L (ref 45–117)
ANION GAP: 5 (ref 5–15)
AST(SGOT): 30 U/L (ref 15–37)
BUN SERPL-MCNC: 15 MG/DL (ref 7–18)
BUN/CREAT RATIO: 13.8 RATIO (ref 10–20)
CALCIUM SERPL-MCNC: 9.3 MG/DL (ref 8.5–10.1)
CARBON DIOXIDE: 29 MMOL/L (ref 21–32)
CHLORIDE: 105 MMOL/L (ref 98–107)
DEPRECATED RDW RBC: 49.2 FL (ref 35.1–43.9)
ERYTHROCYTE [DISTWIDTH] IN BLOOD: 14.6 % (ref 11.6–14.6)
EST GLOM FILT RATE - AFR AMER: 87 ML/MIN (ref 60–?)
GLOBULIN: 3.9 G/DL (ref 2.2–4.2)
GLUCOSE: 99 MG/DL (ref 74–106)
HCT VFR BLD AUTO: 47.2 % (ref 40–54)
HEMOGLOBIN: 15.1 G/DL (ref 13–16.5)
HGB BLD-MCNC: 15.1 G/DL (ref 13–16.5)
IMMATURE GRANULOCYTES COUNT: 0.02 X10^3/UL (ref 0–0)
MCV RBC: 92.9 FL (ref 80–94)
MEAN CORP HGB CONC: 32 G/DL (ref 32–36)
MEAN PLATELET VOL.: 11 FL (ref 6.2–12)
NRBC FLAGGED BY ANALYZER: 0 % (ref 0–5)
PLATELET # BLD: 196 K/MM3 (ref 150–450)
PLATELET COUNT: 196 K/MM3 (ref 150–450)
POTASSIUM: 4.2 MMOL/L (ref 3.5–5.1)
RBC # BLD AUTO: 5.08 M/MM3 (ref 4.6–6.2)
RBC DISTRIBUTION WIDTH CV: 14.6 % (ref 11.6–14.6)
RBC DISTRIBUTION WIDTH SD: 49.2 FL (ref 35.1–43.9)
WBC # BLD AUTO: 7.5 K/MM3 (ref 4.4–11)
WHITE BLOOD COUNT: 7.5 K/MM3 (ref 4.4–11)

## 2021-03-08 PROCEDURE — 80053 COMPREHEN METABOLIC PANEL: CPT

## 2021-03-08 PROCEDURE — 85025 COMPLETE CBC W/AUTO DIFF WBC: CPT

## 2021-03-08 PROCEDURE — 36415 COLL VENOUS BLD VENIPUNCTURE: CPT

## 2021-05-14 ENCOUNTER — HOSPITAL ENCOUNTER (OUTPATIENT)
Age: 66
End: 2021-05-14
Payer: MEDICARE

## 2021-05-14 VITALS — BODY MASS INDEX: 30.6 KG/M2

## 2021-05-14 DIAGNOSIS — F32.89: ICD-10-CM

## 2021-05-14 DIAGNOSIS — M79.7: ICD-10-CM

## 2021-05-14 DIAGNOSIS — E78.5: ICD-10-CM

## 2021-05-14 DIAGNOSIS — Z79.899: ICD-10-CM

## 2021-05-14 DIAGNOSIS — L40.8: ICD-10-CM

## 2021-05-14 DIAGNOSIS — L40.59: Primary | ICD-10-CM

## 2021-05-14 DIAGNOSIS — I10: ICD-10-CM

## 2021-05-14 DIAGNOSIS — M21.40: ICD-10-CM

## 2021-05-14 DIAGNOSIS — E03.9: ICD-10-CM

## 2021-05-14 LAB
ALANINE AMINOTRANSFER ALT/SGPT: 21 U/L (ref 16–61)
ALBUMIN SERPL-MCNC: 4.1 G/DL (ref 3.2–5)
ALKALINE PHOSPHATASE: 105 U/L (ref 45–117)
ANION GAP: 5 (ref 5–15)
AST(SGOT): 17 U/L (ref 15–37)
BUN SERPL-MCNC: 15 MG/DL (ref 7–18)
BUN/CREAT RATIO: 13.8 RATIO (ref 10–20)
CALCIUM SERPL-MCNC: 9.3 MG/DL (ref 8.5–10.1)
CARBON DIOXIDE: 29 MMOL/L (ref 21–32)
CHLORIDE: 105 MMOL/L (ref 98–107)
DEPRECATED RDW RBC: 54.6 FL (ref 35.1–43.9)
ERYTHROCYTE [DISTWIDTH] IN BLOOD: 15.9 % (ref 11.6–14.6)
EST GLOM FILT RATE - AFR AMER: 87 ML/MIN (ref 60–?)
GLOBULIN: 3.7 G/DL (ref 2.2–4.2)
GLUCOSE: 116 MG/DL (ref 74–106)
HCT VFR BLD AUTO: 46.2 % (ref 40–54)
HEMOGLOBIN: 14.8 G/DL (ref 13–16.5)
HGB BLD-MCNC: 14.8 G/DL (ref 13–16.5)
IMMATURE GRANULOCYTES COUNT: 0.03 X10^3/UL (ref 0–0)
MCV RBC: 94.1 FL (ref 80–94)
MEAN CORP HGB CONC: 32 G/DL (ref 32–36)
MEAN PLATELET VOL.: 11.3 FL (ref 6.2–12)
NRBC FLAGGED BY ANALYZER: 0 % (ref 0–5)
PLATELET # BLD: 209 K/MM3 (ref 150–450)
PLATELET COUNT: 209 K/MM3 (ref 150–450)
POTASSIUM: 4.2 MMOL/L (ref 3.5–5.1)
RBC # BLD AUTO: 4.91 M/MM3 (ref 4.6–6.2)
RBC DISTRIBUTION WIDTH CV: 15.9 % (ref 11.6–14.6)
RBC DISTRIBUTION WIDTH SD: 54.6 FL (ref 35.1–43.9)
WBC # BLD AUTO: 9.2 K/MM3 (ref 4.4–11)
WHITE BLOOD COUNT: 9.2 K/MM3 (ref 4.4–11)

## 2021-05-14 PROCEDURE — 85025 COMPLETE CBC W/AUTO DIFF WBC: CPT

## 2021-05-14 PROCEDURE — 36415 COLL VENOUS BLD VENIPUNCTURE: CPT

## 2021-05-14 PROCEDURE — 80053 COMPREHEN METABOLIC PANEL: CPT

## 2021-07-13 ENCOUNTER — HOSPITAL ENCOUNTER (OUTPATIENT)
Age: 66
End: 2021-07-13
Payer: MEDICARE

## 2021-07-13 VITALS — BODY MASS INDEX: 30.6 KG/M2

## 2021-07-13 DIAGNOSIS — Z92.29: Primary | ICD-10-CM

## 2021-07-13 PROCEDURE — 86769 SARS-COV-2 COVID-19 ANTIBODY: CPT

## 2021-07-13 PROCEDURE — 36415 COLL VENOUS BLD VENIPUNCTURE: CPT

## 2021-08-06 ENCOUNTER — HOSPITAL ENCOUNTER (OUTPATIENT)
Dept: HOSPITAL 100 - LAB | Age: 66
End: 2021-08-06
Payer: MEDICARE

## 2021-08-06 VITALS — BODY MASS INDEX: 30.6 KG/M2

## 2021-08-06 DIAGNOSIS — M21.40: ICD-10-CM

## 2021-08-06 DIAGNOSIS — E03.9: ICD-10-CM

## 2021-08-06 DIAGNOSIS — M79.7: ICD-10-CM

## 2021-08-06 DIAGNOSIS — F32.89: ICD-10-CM

## 2021-08-06 DIAGNOSIS — H81.10: ICD-10-CM

## 2021-08-06 DIAGNOSIS — L40.8: ICD-10-CM

## 2021-08-06 DIAGNOSIS — E78.5: ICD-10-CM

## 2021-08-06 DIAGNOSIS — I10: ICD-10-CM

## 2021-08-06 DIAGNOSIS — Z79.899: ICD-10-CM

## 2021-08-06 DIAGNOSIS — L40.59: Primary | ICD-10-CM

## 2021-08-06 LAB
ALANINE AMINOTRANSFER ALT/SGPT: 25 U/L (ref 16–61)
ALBUMIN SERPL-MCNC: 4.3 G/DL (ref 3.2–5)
ALKALINE PHOSPHATASE: 92 U/L (ref 45–117)
ANION GAP: 7 (ref 5–15)
AST(SGOT): 22 U/L (ref 15–37)
BUN SERPL-MCNC: 15 MG/DL (ref 7–18)
BUN/CREAT RATIO: 14.3 RATIO (ref 10–20)
CALCIUM SERPL-MCNC: 9 MG/DL (ref 8.5–10.1)
CARBON DIOXIDE: 25 MMOL/L (ref 21–32)
CHLORIDE: 108 MMOL/L (ref 98–107)
DEPRECATED RDW RBC: 48.9 FL (ref 35.1–43.9)
ERYTHROCYTE [DISTWIDTH] IN BLOOD: 14.6 % (ref 11.6–14.6)
EST GLOM FILT RATE - AFR AMER: 90 ML/MIN (ref 60–?)
GLOBULIN: 3.3 G/DL (ref 2.2–4.2)
GLUCOSE: 117 MG/DL (ref 74–106)
HCT VFR BLD AUTO: 41.6 % (ref 40–54)
HEMOGLOBIN: 14 G/DL (ref 13–16.5)
HGB BLD-MCNC: 14 G/DL (ref 13–16.5)
IMMATURE GRANULOCYTES COUNT: 0.03 X10^3/UL (ref 0–0)
MCV RBC: 92.7 FL (ref 80–94)
MEAN CORP HGB CONC: 33.7 G/DL (ref 32–36)
MEAN PLATELET VOL.: 11.3 FL (ref 6.2–12)
NRBC FLAGGED BY ANALYZER: 0 % (ref 0–5)
PLATELET # BLD: 194 K/MM3 (ref 150–450)
PLATELET COUNT: 194 K/MM3 (ref 150–450)
POTASSIUM: 4.2 MMOL/L (ref 3.5–5.1)
RBC # BLD AUTO: 4.49 M/MM3 (ref 4.6–6.2)
RBC DISTRIBUTION WIDTH CV: 14.6 % (ref 11.6–14.6)
RBC DISTRIBUTION WIDTH SD: 48.9 FL (ref 35.1–43.9)
WBC # BLD AUTO: 7.8 K/MM3 (ref 4.4–11)
WHITE BLOOD COUNT: 7.8 K/MM3 (ref 4.4–11)

## 2021-08-06 PROCEDURE — 36415 COLL VENOUS BLD VENIPUNCTURE: CPT

## 2021-08-06 PROCEDURE — 80053 COMPREHEN METABOLIC PANEL: CPT

## 2021-08-06 PROCEDURE — 85025 COMPLETE CBC W/AUTO DIFF WBC: CPT

## 2021-10-19 ENCOUNTER — HOSPITAL ENCOUNTER (OUTPATIENT)
Age: 66
End: 2021-10-19
Payer: MEDICARE

## 2021-10-19 DIAGNOSIS — M79.7: ICD-10-CM

## 2021-10-19 DIAGNOSIS — L40.8: ICD-10-CM

## 2021-10-19 DIAGNOSIS — H81.10: ICD-10-CM

## 2021-10-19 DIAGNOSIS — E03.9: ICD-10-CM

## 2021-10-19 DIAGNOSIS — L40.59: Primary | ICD-10-CM

## 2021-10-19 DIAGNOSIS — I10: ICD-10-CM

## 2021-10-19 DIAGNOSIS — E78.5: ICD-10-CM

## 2021-10-19 DIAGNOSIS — F32.89: ICD-10-CM

## 2021-10-19 DIAGNOSIS — M21.40: ICD-10-CM

## 2021-10-19 DIAGNOSIS — Z79.899: ICD-10-CM

## 2021-10-19 LAB
ALANINE AMINOTRANSFER ALT/SGPT: 23 U/L (ref 16–61)
ALBUMIN SERPL-MCNC: 3.6 G/DL (ref 3.2–5)
ALKALINE PHOSPHATASE: 92 U/L (ref 45–117)
ANION GAP: 8 (ref 5–15)
AST(SGOT): 18 U/L (ref 15–37)
BUN SERPL-MCNC: 13 MG/DL (ref 7–18)
BUN/CREAT RATIO: 11.3 RATIO (ref 10–20)
CALCIUM SERPL-MCNC: 8.9 MG/DL (ref 8.5–10.1)
CARBON DIOXIDE: 28 MMOL/L (ref 21–32)
CHLORIDE: 104 MMOL/L (ref 98–107)
DEPRECATED RDW RBC: 49.6 FL (ref 35.1–43.9)
ERYTHROCYTE [DISTWIDTH] IN BLOOD: 14.5 % (ref 11.6–14.6)
EST GLOM FILT RATE - AFR AMER: 81 ML/MIN (ref 60–?)
GLOBULIN: 3.6 G/DL (ref 2.2–4.2)
GLUCOSE: 155 MG/DL (ref 74–106)
HCT VFR BLD AUTO: 40.8 % (ref 40–54)
HEMOGLOBIN: 13.4 G/DL (ref 13–16.5)
HGB BLD-MCNC: 13.4 G/DL (ref 13–16.5)
IMMATURE GRANULOCYTES COUNT: 0.02 X10^3/UL (ref 0–0)
MCV RBC: 94.9 FL (ref 80–94)
MEAN CORP HGB CONC: 32.8 G/DL (ref 32–36)
MEAN PLATELET VOL.: 11.1 FL (ref 6.2–12)
NRBC FLAGGED BY ANALYZER: 0 % (ref 0–5)
PLATELET # BLD: 161 K/MM3 (ref 150–450)
PLATELET COUNT: 161 K/MM3 (ref 150–450)
POTASSIUM: 4 MMOL/L (ref 3.5–5.1)
RBC # BLD AUTO: 4.3 M/MM3 (ref 4.6–6.2)
RBC DISTRIBUTION WIDTH CV: 14.5 % (ref 11.6–14.6)
RBC DISTRIBUTION WIDTH SD: 49.6 FL (ref 35.1–43.9)
WBC # BLD AUTO: 6.3 K/MM3 (ref 4.4–11)
WHITE BLOOD COUNT: 6.3 K/MM3 (ref 4.4–11)

## 2021-10-19 PROCEDURE — 80053 COMPREHEN METABOLIC PANEL: CPT

## 2021-10-19 PROCEDURE — 36415 COLL VENOUS BLD VENIPUNCTURE: CPT

## 2021-10-19 PROCEDURE — 85025 COMPLETE CBC W/AUTO DIFF WBC: CPT

## 2022-02-07 ENCOUNTER — HOSPITAL ENCOUNTER (OUTPATIENT)
Dept: HOSPITAL 100 - MFPLAB | Age: 67
Discharge: HOME | End: 2022-02-07
Payer: MEDICARE

## 2022-02-07 DIAGNOSIS — I72.4: Primary | ICD-10-CM

## 2022-02-07 LAB — PSA,TOTAL - ANNUAL SCREEN: 1.12 NG/ML (ref 0–4)

## 2022-02-07 PROCEDURE — 84153 ASSAY OF PSA TOTAL: CPT

## 2022-02-07 PROCEDURE — G0103 PSA SCREENING: HCPCS

## 2022-02-07 PROCEDURE — 36415 COLL VENOUS BLD VENIPUNCTURE: CPT

## 2022-04-06 ENCOUNTER — HOSPITAL ENCOUNTER (OUTPATIENT)
Dept: HOSPITAL 100 - MFPLAB | Age: 67
Discharge: HOME | End: 2022-04-06
Payer: MEDICARE

## 2022-04-06 DIAGNOSIS — M21.40: ICD-10-CM

## 2022-04-06 DIAGNOSIS — E03.9: ICD-10-CM

## 2022-04-06 DIAGNOSIS — E78.5: ICD-10-CM

## 2022-04-06 DIAGNOSIS — I10: ICD-10-CM

## 2022-04-06 DIAGNOSIS — L40.59: Primary | ICD-10-CM

## 2022-04-06 DIAGNOSIS — L40.8: ICD-10-CM

## 2022-04-06 DIAGNOSIS — Z79.899: ICD-10-CM

## 2022-04-06 DIAGNOSIS — H81.10: ICD-10-CM

## 2022-04-06 DIAGNOSIS — M79.7: ICD-10-CM

## 2022-04-06 DIAGNOSIS — F32.89: ICD-10-CM

## 2022-04-06 LAB
ALANINE AMINOTRANSFER ALT/SGPT: 19 U/L (ref 16–61)
ALBUMIN SERPL-MCNC: 4 G/DL (ref 3.2–5)
ALKALINE PHOSPHATASE: 97 U/L (ref 45–117)
ANION GAP: 2 (ref 5–15)
AST(SGOT): 16 U/L (ref 15–37)
BUN SERPL-MCNC: 16 MG/DL (ref 7–18)
BUN/CREAT RATIO: 13.6 RATIO (ref 10–20)
CALCIUM SERPL-MCNC: 8.8 MG/DL (ref 8.5–10.1)
CARBON DIOXIDE: 28 MMOL/L (ref 21–32)
CHLORIDE: 106 MMOL/L (ref 98–107)
DEPRECATED RDW RBC: 50.9 FL (ref 35.1–43.9)
ERYTHROCYTE [DISTWIDTH] IN BLOOD: 15 % (ref 11.6–14.6)
EST GLOM FILT RATE - AFR AMER: 79 ML/MIN (ref 60–?)
GLOBULIN: 3.3 G/DL (ref 2.2–4.2)
GLUCOSE: 135 MG/DL (ref 74–106)
HCT VFR BLD AUTO: 42.5 % (ref 40–54)
HEMOGLOBIN: 13.7 G/DL (ref 13–16.5)
HGB BLD-MCNC: 13.7 G/DL (ref 13–16.5)
IMMATURE GRANULOCYTES COUNT: 0.03 X10^3/UL (ref 0–0)
MCV RBC: 93 FL (ref 80–94)
MEAN CORP HGB CONC: 32.2 G/DL (ref 32–36)
MEAN PLATELET VOL.: 10.9 FL (ref 6.2–12)
NRBC FLAGGED BY ANALYZER: 0 % (ref 0–5)
PLATELET # BLD: 174 K/MM3 (ref 150–450)
PLATELET COUNT: 174 K/MM3 (ref 150–450)
POTASSIUM: 4 MMOL/L (ref 3.5–5.1)
RBC # BLD AUTO: 4.57 M/MM3 (ref 4.6–6.2)
RBC DISTRIBUTION WIDTH CV: 15 % (ref 11.6–14.6)
RBC DISTRIBUTION WIDTH SD: 50.9 FL (ref 35.1–43.9)
WBC # BLD AUTO: 9 K/MM3 (ref 4.4–11)
WHITE BLOOD COUNT: 9 K/MM3 (ref 4.4–11)

## 2022-04-06 PROCEDURE — 36415 COLL VENOUS BLD VENIPUNCTURE: CPT

## 2022-04-06 PROCEDURE — 80053 COMPREHEN METABOLIC PANEL: CPT

## 2022-04-06 PROCEDURE — 85025 COMPLETE CBC W/AUTO DIFF WBC: CPT

## 2022-07-05 ENCOUNTER — HOSPITAL ENCOUNTER (OUTPATIENT)
Age: 67
Discharge: HOME | End: 2022-07-05
Payer: MEDICARE

## 2022-07-05 DIAGNOSIS — H81.10: ICD-10-CM

## 2022-07-05 DIAGNOSIS — M79.7: ICD-10-CM

## 2022-07-05 DIAGNOSIS — F32.89: ICD-10-CM

## 2022-07-05 DIAGNOSIS — I10: ICD-10-CM

## 2022-07-05 DIAGNOSIS — Z79.899: ICD-10-CM

## 2022-07-05 DIAGNOSIS — L40.59: Primary | ICD-10-CM

## 2022-07-05 DIAGNOSIS — E03.9: ICD-10-CM

## 2022-07-05 DIAGNOSIS — L40.8: ICD-10-CM

## 2022-07-05 DIAGNOSIS — E78.5: ICD-10-CM

## 2022-07-05 DIAGNOSIS — M21.40: ICD-10-CM

## 2022-07-05 LAB
ALANINE AMINOTRANSFER ALT/SGPT: 20 U/L (ref 16–61)
ALBUMIN SERPL-MCNC: 3.8 G/DL (ref 3.2–5)
ALKALINE PHOSPHATASE: 88 U/L (ref 45–117)
ANION GAP: 4 (ref 5–15)
AST(SGOT): 18 U/L (ref 15–37)
BUN SERPL-MCNC: 11 MG/DL (ref 7–18)
BUN/CREAT RATIO: 9.9 RATIO (ref 10–20)
CALCIUM SERPL-MCNC: 8.9 MG/DL (ref 8.5–10.1)
CARBON DIOXIDE: 28 MMOL/L (ref 21–32)
CHLORIDE: 106 MMOL/L (ref 98–107)
DEPRECATED RDW RBC: 49 FL (ref 35.1–43.9)
ERYTHROCYTE [DISTWIDTH] IN BLOOD: 15.1 % (ref 11.6–14.6)
EST GLOM FILT RATE - AFR AMER: 85 ML/MIN (ref 60–?)
GLOBULIN: 3.3 G/DL (ref 2.2–4.2)
GLUCOSE: 124 MG/DL (ref 74–106)
HCT VFR BLD AUTO: 37.8 % (ref 40–54)
HEMOGLOBIN: 12.6 G/DL (ref 13–16.5)
HGB BLD-MCNC: 12.6 G/DL (ref 13–16.5)
IMMATURE GRANULOCYTES COUNT: 0.02 X10^3/UL (ref 0–0)
MCV RBC: 90.2 FL (ref 80–94)
MEAN CORP HGB CONC: 33.3 G/DL (ref 32–36)
MEAN PLATELET VOL.: 11.2 FL (ref 6.2–12)
NRBC FLAGGED BY ANALYZER: 0 % (ref 0–5)
PLATELET # BLD: 167 K/MM3 (ref 150–450)
PLATELET COUNT: 167 K/MM3 (ref 150–450)
POTASSIUM: 4 MMOL/L (ref 3.5–5.1)
RBC # BLD AUTO: 4.19 M/MM3 (ref 4.6–6.2)
RBC DISTRIBUTION WIDTH CV: 15.1 % (ref 11.6–14.6)
RBC DISTRIBUTION WIDTH SD: 49 FL (ref 35.1–43.9)
WBC # BLD AUTO: 7 K/MM3 (ref 4.4–11)
WHITE BLOOD COUNT: 7 K/MM3 (ref 4.4–11)

## 2022-07-05 PROCEDURE — 80053 COMPREHEN METABOLIC PANEL: CPT

## 2022-07-05 PROCEDURE — 36415 COLL VENOUS BLD VENIPUNCTURE: CPT

## 2022-07-05 PROCEDURE — 85025 COMPLETE CBC W/AUTO DIFF WBC: CPT

## 2022-08-10 ENCOUNTER — HOSPITAL ENCOUNTER (OUTPATIENT)
Dept: HOSPITAL 100 - MFPLAB | Age: 67
Discharge: HOME | End: 2022-08-10
Payer: MEDICARE

## 2022-08-10 DIAGNOSIS — E78.00: Primary | ICD-10-CM

## 2022-08-10 DIAGNOSIS — I10: ICD-10-CM

## 2022-08-10 DIAGNOSIS — E03.9: ICD-10-CM

## 2022-08-10 LAB
CHOLEST SERPL-MCNC: 111 MG/DL
CREAT UR-MCNC: 194 MG/DL
MICROALBUMIN UR-MCNC: 14.7 MG/L
T4 SERPL-MCNC: 7.6 UG/DL (ref 4.5–12.1)
TRIGLYCERIDES: 127 MG/DL
VLDLC SERPL-MCNC: 25 MG/DL (ref 5–40)

## 2022-08-10 PROCEDURE — 82043 UR ALBUMIN QUANTITATIVE: CPT

## 2022-08-10 PROCEDURE — 82570 ASSAY OF URINE CREATININE: CPT

## 2022-08-10 PROCEDURE — 36415 COLL VENOUS BLD VENIPUNCTURE: CPT

## 2022-08-10 PROCEDURE — 80061 LIPID PANEL: CPT

## 2022-08-10 PROCEDURE — 84436 ASSAY OF TOTAL THYROXINE: CPT

## 2022-08-10 PROCEDURE — 84443 ASSAY THYROID STIM HORMONE: CPT

## 2022-09-15 ENCOUNTER — HOSPITAL ENCOUNTER (OUTPATIENT)
Dept: HOSPITAL 100 - MFPLAB | Age: 67
Discharge: HOME | End: 2022-09-15
Payer: MEDICARE

## 2022-09-15 DIAGNOSIS — M21.40: ICD-10-CM

## 2022-09-15 DIAGNOSIS — H81.10: ICD-10-CM

## 2022-09-15 DIAGNOSIS — F32.89: ICD-10-CM

## 2022-09-15 DIAGNOSIS — Z79.899: ICD-10-CM

## 2022-09-15 DIAGNOSIS — M79.7: ICD-10-CM

## 2022-09-15 DIAGNOSIS — L40.8: ICD-10-CM

## 2022-09-15 DIAGNOSIS — L40.59: Primary | ICD-10-CM

## 2022-09-15 DIAGNOSIS — I10: ICD-10-CM

## 2022-09-15 DIAGNOSIS — E03.9: ICD-10-CM

## 2022-09-15 DIAGNOSIS — E78.5: ICD-10-CM

## 2022-09-15 LAB
ALANINE AMINOTRANSFER ALT/SGPT: 21 U/L (ref 16–61)
ALBUMIN SERPL-MCNC: 3.9 G/DL (ref 3.2–5)
ALKALINE PHOSPHATASE: 84 U/L (ref 45–117)
ANION GAP: 6 (ref 5–15)
AST(SGOT): 21 U/L (ref 15–37)
BUN SERPL-MCNC: 14 MG/DL (ref 7–18)
BUN/CREAT RATIO: 14 RATIO (ref 10–20)
CALCIUM SERPL-MCNC: 9 MG/DL (ref 8.5–10.1)
CARBON DIOXIDE: 29 MMOL/L (ref 21–32)
CHLORIDE: 107 MMOL/L (ref 98–107)
DEPRECATED RDW RBC: 51.7 FL (ref 35.1–43.9)
ERYTHROCYTE [DISTWIDTH] IN BLOOD: 15.5 % (ref 11.6–14.6)
EST GLOM FILT RATE - AFR AMER: 95 ML/MIN (ref 60–?)
GLOBULIN: 3.3 G/DL (ref 2.2–4.2)
GLUCOSE: 101 MG/DL (ref 74–106)
HCT VFR BLD AUTO: 38.2 % (ref 40–54)
HEMOGLOBIN: 11.9 G/DL (ref 13–16.5)
HGB BLD-MCNC: 11.9 G/DL (ref 13–16.5)
IMMATURE GRANULOCYTES COUNT: 0.03 X10^3/UL (ref 0–0)
MCV RBC: 92 FL (ref 80–94)
MEAN CORP HGB CONC: 31.2 G/DL (ref 32–36)
MEAN PLATELET VOL.: 11.4 FL (ref 6.2–12)
NRBC FLAGGED BY ANALYZER: 0 % (ref 0–5)
PLATELET # BLD: 166 K/MM3 (ref 150–450)
PLATELET COUNT: 166 K/MM3 (ref 150–450)
POTASSIUM: 4 MMOL/L (ref 3.5–5.1)
RBC # BLD AUTO: 4.15 M/MM3 (ref 4.6–6.2)
RBC DISTRIBUTION WIDTH CV: 15.5 % (ref 11.6–14.6)
RBC DISTRIBUTION WIDTH SD: 51.7 FL (ref 35.1–43.9)
WBC # BLD AUTO: 7.4 K/MM3 (ref 4.4–11)
WHITE BLOOD COUNT: 7.4 K/MM3 (ref 4.4–11)

## 2022-09-15 PROCEDURE — 80053 COMPREHEN METABOLIC PANEL: CPT

## 2022-09-15 PROCEDURE — 85025 COMPLETE CBC W/AUTO DIFF WBC: CPT

## 2022-09-15 PROCEDURE — 36415 COLL VENOUS BLD VENIPUNCTURE: CPT

## 2022-12-07 ENCOUNTER — HOSPITAL ENCOUNTER (OUTPATIENT)
Dept: HOSPITAL 100 - MFPLAB | Age: 67
Discharge: HOME | End: 2022-12-07
Payer: MEDICARE

## 2022-12-07 DIAGNOSIS — L40.59: Primary | ICD-10-CM

## 2022-12-07 DIAGNOSIS — L40.8: ICD-10-CM

## 2022-12-07 DIAGNOSIS — Z79.899: ICD-10-CM

## 2022-12-07 LAB
ALANINE AMINOTRANSFER ALT/SGPT: 18 U/L (ref 16–61)
ALBUMIN SERPL-MCNC: 4.1 G/DL (ref 3.2–5)
ALKALINE PHOSPHATASE: 93 U/L (ref 45–117)
ANION GAP: 8 (ref 5–15)
AST(SGOT): 18 U/L (ref 15–37)
BUN SERPL-MCNC: 15 MG/DL (ref 7–18)
BUN/CREAT RATIO: 14.4 RATIO (ref 10–20)
CALCIUM SERPL-MCNC: 8.9 MG/DL (ref 8.5–10.1)
CARBON DIOXIDE: 28 MMOL/L (ref 21–32)
CHLORIDE: 104 MMOL/L (ref 98–107)
DEPRECATED RDW RBC: 49.9 FL (ref 35.1–43.9)
ERYTHROCYTE [DISTWIDTH] IN BLOOD: 16.1 % (ref 11.6–14.6)
EST GLOM FILT RATE - AFR AMER: 91 ML/MIN (ref 60–?)
GLOBULIN: 3.2 G/DL (ref 2.2–4.2)
GLUCOSE: 108 MG/DL (ref 74–106)
HCT VFR BLD AUTO: 39 % (ref 40–54)
HEMOGLOBIN: 12.2 G/DL (ref 13–16.5)
HGB BLD-MCNC: 12.2 G/DL (ref 13–16.5)
IMMATURE GRANULOCYTES COUNT: 0.04 X10^3/UL (ref 0–0)
MCV RBC: 85.9 FL (ref 80–94)
MEAN CORP HGB CONC: 31.3 G/DL (ref 32–36)
MEAN PLATELET VOL.: 11.3 FL (ref 6.2–12)
NRBC FLAGGED BY ANALYZER: 0 % (ref 0–5)
PLATELET # BLD: 234 K/MM3 (ref 150–450)
PLATELET COUNT: 234 K/MM3 (ref 150–450)
POTASSIUM: 3.6 MMOL/L (ref 3.5–5.1)
RBC # BLD AUTO: 4.54 M/MM3 (ref 4.6–6.2)
RBC DISTRIBUTION WIDTH CV: 16.1 % (ref 11.6–14.6)
RBC DISTRIBUTION WIDTH SD: 49.9 FL (ref 35.1–43.9)
WBC # BLD AUTO: 12.4 K/MM3 (ref 4.4–11)
WHITE BLOOD COUNT: 12.4 K/MM3 (ref 4.4–11)

## 2022-12-07 PROCEDURE — 85025 COMPLETE CBC W/AUTO DIFF WBC: CPT

## 2022-12-07 PROCEDURE — 36415 COLL VENOUS BLD VENIPUNCTURE: CPT

## 2022-12-07 PROCEDURE — 80053 COMPREHEN METABOLIC PANEL: CPT

## 2023-02-15 ENCOUNTER — HOSPITAL ENCOUNTER (OUTPATIENT)
Dept: HOSPITAL 100 - MFPLAB | Age: 68
Discharge: HOME | End: 2023-02-15
Payer: MEDICARE

## 2023-02-15 DIAGNOSIS — Z12.5: ICD-10-CM

## 2023-02-15 DIAGNOSIS — Z00.00: Primary | ICD-10-CM

## 2023-02-15 LAB — PSA,TOTAL - ANNUAL SCREEN: 0.91 NG/ML (ref 0–4)

## 2023-02-15 PROCEDURE — G0103 PSA SCREENING: HCPCS

## 2023-02-15 PROCEDURE — 36415 COLL VENOUS BLD VENIPUNCTURE: CPT

## 2023-02-15 PROCEDURE — 84153 ASSAY OF PSA TOTAL: CPT

## 2023-03-02 ENCOUNTER — HOSPITAL ENCOUNTER (OUTPATIENT)
Dept: HOSPITAL 100 - MFPLAB | Age: 68
Discharge: HOME | End: 2023-03-02
Payer: MEDICARE

## 2023-03-02 DIAGNOSIS — L40.59: Primary | ICD-10-CM

## 2023-03-02 DIAGNOSIS — L40.8: ICD-10-CM

## 2023-03-02 DIAGNOSIS — Z79.899: ICD-10-CM

## 2023-03-02 LAB
ALANINE AMINOTRANSFER ALT/SGPT: 15 U/L (ref 16–61)
ALBUMIN SERPL-MCNC: 4.1 G/DL (ref 3.2–5)
ALKALINE PHOSPHATASE: 108 U/L (ref 45–117)
ANION GAP: 8 (ref 5–15)
AST(SGOT): 21 U/L (ref 15–37)
BUN SERPL-MCNC: 13 MG/DL (ref 7–18)
BUN/CREAT RATIO: 14.2 RATIO (ref 10–20)
CALCIUM SERPL-MCNC: 9.1 MG/DL (ref 8.5–10.1)
CARBON DIOXIDE: 25 MMOL/L (ref 21–32)
CHLORIDE: 108 MMOL/L (ref 98–107)
DEPRECATED RDW RBC: 54.4 FL (ref 35.1–43.9)
ERYTHROCYTE [DISTWIDTH] IN BLOOD: 17.9 % (ref 11.6–14.6)
EST GLOM FILT RATE - AFR AMER: 105 ML/MIN (ref 60–?)
GLOBULIN: 3.1 G/DL (ref 2.2–4.2)
GLUCOSE: 91 MG/DL (ref 74–106)
HCT VFR BLD AUTO: 39.2 % (ref 40–54)
HEMOGLOBIN: 12.1 G/DL (ref 13–16.5)
HGB BLD-MCNC: 12.1 G/DL (ref 13–16.5)
IMMATURE GRANULOCYTES COUNT: 0.04 X10^3/UL (ref 0–0)
MCV RBC: 84.8 FL (ref 80–94)
MEAN CORP HGB CONC: 30.9 G/DL (ref 32–36)
MEAN PLATELET VOL.: 11.4 FL (ref 6.2–12)
NRBC FLAGGED BY ANALYZER: 0 % (ref 0–5)
PLATELET # BLD: 222 K/MM3 (ref 150–450)
PLATELET COUNT: 222 K/MM3 (ref 150–450)
POTASSIUM: 4 MMOL/L (ref 3.5–5.1)
RBC # BLD AUTO: 4.62 M/MM3 (ref 4.6–6.2)
RBC DISTRIBUTION WIDTH CV: 17.9 % (ref 11.6–14.6)
RBC DISTRIBUTION WIDTH SD: 54.4 FL (ref 35.1–43.9)
WBC # BLD AUTO: 10.8 K/MM3 (ref 4.4–11)
WHITE BLOOD COUNT: 10.8 K/MM3 (ref 4.4–11)

## 2023-03-02 PROCEDURE — 80053 COMPREHEN METABOLIC PANEL: CPT

## 2023-03-02 PROCEDURE — 85025 COMPLETE CBC W/AUTO DIFF WBC: CPT

## 2023-03-02 PROCEDURE — 36415 COLL VENOUS BLD VENIPUNCTURE: CPT

## 2023-06-01 ENCOUNTER — HOSPITAL ENCOUNTER (OUTPATIENT)
Dept: HOSPITAL 100 - MFPLAB | Age: 68
Discharge: HOME | End: 2023-06-01
Payer: MEDICARE

## 2023-06-01 DIAGNOSIS — Z79.899: ICD-10-CM

## 2023-06-01 DIAGNOSIS — L40.59: Primary | ICD-10-CM

## 2023-06-01 DIAGNOSIS — L40.8: ICD-10-CM

## 2023-06-01 DIAGNOSIS — M47.897: ICD-10-CM

## 2023-06-01 LAB
ALANINE AMINOTRANSFER ALT/SGPT: 28 U/L (ref 16–61)
ALBUMIN SERPL-MCNC: 3.8 G/DL (ref 3.2–5)
ALKALINE PHOSPHATASE: 96 U/L (ref 45–117)
ANION GAP: 7 (ref 5–15)
AST(SGOT): 34 U/L (ref 15–37)
BUN SERPL-MCNC: 16 MG/DL (ref 7–18)
BUN/CREAT RATIO: 16.9 RATIO (ref 10–20)
CALCIUM SERPL-MCNC: 8.8 MG/DL (ref 8.5–10.1)
CARBON DIOXIDE: 26 MMOL/L (ref 21–32)
CHLORIDE: 108 MMOL/L (ref 98–107)
DEPRECATED RDW RBC: 51.6 FL (ref 35.1–43.9)
ERYTHROCYTE [DISTWIDTH] IN BLOOD: 17 % (ref 11.6–14.6)
EST GLOM FILT RATE - AFR AMER: 101 ML/MIN (ref 60–?)
GLOBULIN: 3.2 G/DL (ref 2.2–4.2)
GLUCOSE: 141 MG/DL (ref 74–106)
HCT VFR BLD AUTO: 40.2 % (ref 40–54)
HEMOGLOBIN: 12.4 G/DL (ref 13–16.5)
HGB BLD-MCNC: 12.4 G/DL (ref 13–16.5)
IMMATURE GRANULOCYTES COUNT: 0.03 X10^3/UL (ref 0–0)
MCV RBC: 85.7 FL (ref 80–94)
MEAN CORP HGB CONC: 30.8 G/DL (ref 32–36)
MEAN PLATELET VOL.: 11.3 FL (ref 6.2–12)
NRBC FLAGGED BY ANALYZER: 0 % (ref 0–5)
PLATELET # BLD: 190 K/MM3 (ref 150–450)
PLATELET COUNT: 190 K/MM3 (ref 150–450)
POTASSIUM: 3.8 MMOL/L (ref 3.5–5.1)
RBC # BLD AUTO: 4.69 M/MM3 (ref 4.6–6.2)
RBC DISTRIBUTION WIDTH CV: 17 % (ref 11.6–14.6)
RBC DISTRIBUTION WIDTH SD: 51.6 FL (ref 35.1–43.9)
WBC # BLD AUTO: 9 K/MM3 (ref 4.4–11)
WHITE BLOOD COUNT: 9 K/MM3 (ref 4.4–11)

## 2023-06-01 PROCEDURE — 80053 COMPREHEN METABOLIC PANEL: CPT

## 2023-06-01 PROCEDURE — 36415 COLL VENOUS BLD VENIPUNCTURE: CPT

## 2023-06-01 PROCEDURE — 85025 COMPLETE CBC W/AUTO DIFF WBC: CPT

## 2023-08-18 ENCOUNTER — HOSPITAL ENCOUNTER (OUTPATIENT)
Dept: HOSPITAL 100 - MFPLAB | Age: 68
Discharge: HOME | End: 2023-08-18
Payer: MEDICARE

## 2023-08-18 DIAGNOSIS — I10: Primary | ICD-10-CM

## 2023-08-18 DIAGNOSIS — E78.00: ICD-10-CM

## 2023-08-18 DIAGNOSIS — E03.9: ICD-10-CM

## 2023-08-18 LAB
CHOLEST SERPL-MCNC: 95 MG/DL
CREAT UR-MCNC: 258 MG/DL
MICROALBUMIN UR-MCNC: 17.3 MG/L
T4 SERPL-MCNC: 8.3 UG/DL (ref 4.5–12.1)
TRIGLYCERIDES: 113 MG/DL
VLDLC SERPL-MCNC: 23 MG/DL (ref 5–40)

## 2023-08-18 PROCEDURE — 82043 UR ALBUMIN QUANTITATIVE: CPT

## 2023-08-18 PROCEDURE — 84436 ASSAY OF TOTAL THYROXINE: CPT

## 2023-08-18 PROCEDURE — 82570 ASSAY OF URINE CREATININE: CPT

## 2023-08-18 PROCEDURE — 80061 LIPID PANEL: CPT

## 2023-08-18 PROCEDURE — 84443 ASSAY THYROID STIM HORMONE: CPT

## 2023-08-18 PROCEDURE — 36415 COLL VENOUS BLD VENIPUNCTURE: CPT

## 2023-08-28 ENCOUNTER — HOSPITAL ENCOUNTER (OUTPATIENT)
Age: 68
Discharge: HOME | End: 2023-08-28
Payer: MEDICARE

## 2023-08-28 DIAGNOSIS — L40.59: Primary | ICD-10-CM

## 2023-08-28 DIAGNOSIS — Z79.899: ICD-10-CM

## 2023-08-28 LAB
ALANINE AMINOTRANSFER ALT/SGPT: 19 U/L (ref 16–61)
ALBUMIN SERPL-MCNC: 3.8 G/DL (ref 3.2–5)
ALKALINE PHOSPHATASE: 106 U/L (ref 45–117)
ANION GAP: 6 (ref 5–15)
AST(SGOT): 17 U/L (ref 15–37)
BUN SERPL-MCNC: 13 MG/DL (ref 7–18)
BUN/CREAT RATIO: 13.5 RATIO (ref 10–20)
CALCIUM SERPL-MCNC: 8.7 MG/DL (ref 8.5–10.1)
CARBON DIOXIDE: 25 MMOL/L (ref 21–32)
CHLORIDE: 108 MMOL/L (ref 98–107)
DEPRECATED RDW RBC: 53.6 FL (ref 35.1–43.9)
ERYTHROCYTE [DISTWIDTH] IN BLOOD: 17 % (ref 11.6–14.6)
EST GLOM FILT RATE - AFR AMER: 99 ML/MIN (ref 60–?)
GLOBULIN: 3.5 G/DL (ref 2.2–4.2)
GLUCOSE: 100 MG/DL (ref 74–106)
HCT VFR BLD AUTO: 38.6 % (ref 40–54)
HEMOGLOBIN: 12.2 G/DL (ref 13–16.5)
HGB BLD-MCNC: 12.2 G/DL (ref 13–16.5)
IMMATURE GRANULOCYTES COUNT: 0.04 X10^3/UL (ref 0–0)
MCV RBC: 86.7 FL (ref 80–94)
MEAN CORP HGB CONC: 31.6 G/DL (ref 32–36)
MEAN PLATELET VOL.: 11.2 FL (ref 6.2–12)
NRBC FLAGGED BY ANALYZER: 0 % (ref 0–5)
PLATELET # BLD: 179 K/MM3 (ref 150–450)
PLATELET COUNT: 179 K/MM3 (ref 150–450)
POTASSIUM: 3.8 MMOL/L (ref 3.5–5.1)
RBC # BLD AUTO: 4.45 M/MM3 (ref 4.6–6.2)
RBC DISTRIBUTION WIDTH CV: 17 % (ref 11.6–14.6)
RBC DISTRIBUTION WIDTH SD: 53.6 FL (ref 35.1–43.9)
WBC # BLD AUTO: 9.4 K/MM3 (ref 4.4–11)
WHITE BLOOD COUNT: 9.4 K/MM3 (ref 4.4–11)

## 2023-08-28 PROCEDURE — 85025 COMPLETE CBC W/AUTO DIFF WBC: CPT

## 2023-08-28 PROCEDURE — 80053 COMPREHEN METABOLIC PANEL: CPT

## 2023-08-28 PROCEDURE — 36415 COLL VENOUS BLD VENIPUNCTURE: CPT

## 2023-11-21 ENCOUNTER — HOSPITAL ENCOUNTER (OUTPATIENT)
Dept: HOSPITAL 100 - MTLAB | Age: 68
Discharge: HOME | End: 2023-11-21
Payer: MEDICARE

## 2023-11-21 DIAGNOSIS — L40.59: Primary | ICD-10-CM

## 2023-11-21 DIAGNOSIS — Z79.899: ICD-10-CM

## 2023-11-21 LAB
ALANINE AMINOTRANSFER ALT/SGPT: 16 U/L (ref 16–61)
ALBUMIN SERPL-MCNC: 4.1 G/DL (ref 3.2–5)
ALKALINE PHOSPHATASE: 112 U/L (ref 45–117)
ANION GAP: 5 (ref 5–15)
AST(SGOT): 23 U/L (ref 15–37)
BUN SERPL-MCNC: 14 MG/DL (ref 7–18)
BUN/CREAT RATIO: 14.5 RATIO (ref 10–20)
CALCIUM SERPL-MCNC: 8.8 MG/DL (ref 8.5–10.1)
CARBON DIOXIDE: 29 MMOL/L (ref 21–32)
CHLORIDE: 107 MMOL/L (ref 98–107)
DEPRECATED RDW RBC: 52.1 FL (ref 35.1–43.9)
ERYTHROCYTE [DISTWIDTH] IN BLOOD: 16.1 % (ref 11.6–14.6)
EST GLOM FILT RATE - AFR AMER: 99 ML/MIN (ref 60–?)
GLOBULIN: 3.7 G/DL (ref 2.2–4.2)
GLUCOSE: 114 MG/DL (ref 74–106)
HCT VFR BLD AUTO: 42.1 % (ref 40–54)
HEMOGLOBIN: 13 G/DL (ref 13–16.5)
HGB BLD-MCNC: 13 G/DL (ref 13–16.5)
IMMATURE GRANULOCYTES COUNT: 0.04 X10^3/UL (ref 0–0)
MCV RBC: 88.8 FL (ref 80–94)
MEAN CORP HGB CONC: 30.9 G/DL (ref 32–36)
MEAN PLATELET VOL.: 11.8 FL (ref 6.2–12)
NRBC FLAGGED BY ANALYZER: 0 % (ref 0–5)
PLATELET # BLD: 200 K/MM3 (ref 150–450)
PLATELET COUNT: 200 K/MM3 (ref 150–450)
POTASSIUM: 4.4 MMOL/L (ref 3.5–5.1)
RBC # BLD AUTO: 4.74 M/MM3 (ref 4.6–6.2)
RBC DISTRIBUTION WIDTH CV: 16.1 % (ref 11.6–14.6)
RBC DISTRIBUTION WIDTH SD: 52.1 FL (ref 35.1–43.9)
WBC # BLD AUTO: 11.7 K/MM3 (ref 4.4–11)
WHITE BLOOD COUNT: 11.7 K/MM3 (ref 4.4–11)

## 2023-11-21 PROCEDURE — 80053 COMPREHEN METABOLIC PANEL: CPT

## 2023-11-21 PROCEDURE — 85025 COMPLETE CBC W/AUTO DIFF WBC: CPT

## 2023-11-21 PROCEDURE — 36415 COLL VENOUS BLD VENIPUNCTURE: CPT

## 2024-01-24 ENCOUNTER — APPOINTMENT (OUTPATIENT)
Dept: VASCULAR SURGERY | Facility: HOSPITAL | Age: 69
End: 2024-01-24
Payer: MEDICARE

## 2024-01-24 ENCOUNTER — HOSPITAL ENCOUNTER (OUTPATIENT)
Dept: VASCULAR MEDICINE | Facility: HOSPITAL | Age: 69
Discharge: HOME | End: 2024-01-24
Payer: MEDICARE

## 2024-01-24 DIAGNOSIS — I73.9 PERIPHERAL VASCULAR DISEASE (CMS-HCC): ICD-10-CM

## 2024-01-24 DIAGNOSIS — I73.9 PERIPHERAL VASCULAR DISEASE, UNSPECIFIED (CMS-HCC): ICD-10-CM

## 2024-01-24 DIAGNOSIS — I72.4 POPLITEAL ARTERY ANEURYSM, BILATERAL (CMS-HCC): ICD-10-CM

## 2024-01-24 PROCEDURE — 93922 UPR/L XTREMITY ART 2 LEVELS: CPT

## 2024-01-24 PROCEDURE — 93930 UPPER EXTREMITY STUDY: CPT | Performed by: SURGERY

## 2024-01-24 PROCEDURE — 93930 UPPER EXTREMITY STUDY: CPT

## 2024-01-24 PROCEDURE — 93922 UPR/L XTREMITY ART 2 LEVELS: CPT | Performed by: INTERNAL MEDICINE

## 2024-01-30 PROBLEM — N20.0 CALCULUS OF KIDNEY: Status: ACTIVE | Noted: 2024-01-30

## 2024-01-30 PROBLEM — E78.00 PURE HYPERCHOLESTEROLEMIA: Status: ACTIVE | Noted: 2024-01-30

## 2024-01-30 PROBLEM — I72.4 POPLITEAL ARTERY ANEURYSM (CMS-HCC): Status: ACTIVE | Noted: 2023-01-18

## 2024-01-30 PROBLEM — I10 HYPERTENSION: Status: ACTIVE | Noted: 2023-08-23

## 2024-01-30 PROBLEM — M79.643 PAIN OF HAND: Status: ACTIVE | Noted: 2024-01-30

## 2024-01-30 PROBLEM — F32.A DEPRESSIVE DISORDER: Status: ACTIVE | Noted: 2024-01-30

## 2024-01-30 RX ORDER — AMLODIPINE BESYLATE 5 MG/1
TABLET ORAL
COMMUNITY
Start: 2019-04-12

## 2024-01-30 RX ORDER — FOLIC ACID 1 MG/1
1 TABLET ORAL
COMMUNITY
Start: 2020-07-02

## 2024-01-30 RX ORDER — TIZANIDINE 4 MG/1
TABLET ORAL
COMMUNITY

## 2024-01-30 RX ORDER — WARFARIN SODIUM 4 MG/1
TABLET ORAL
COMMUNITY
Start: 2023-10-29

## 2024-01-30 RX ORDER — FAMOTIDINE 10 MG/1
TABLET ORAL
COMMUNITY

## 2024-01-30 RX ORDER — ACETAMINOPHEN 325 MG/1
TABLET ORAL EVERY 6 HOURS
COMMUNITY
Start: 2019-02-28

## 2024-01-30 RX ORDER — DOCUSATE SODIUM 100 MG/1
CAPSULE, LIQUID FILLED ORAL 2 TIMES DAILY
COMMUNITY
Start: 2019-02-28

## 2024-01-30 RX ORDER — SERTRALINE HYDROCHLORIDE 100 MG/1
TABLET, FILM COATED ORAL
COMMUNITY
Start: 2019-04-12

## 2024-01-30 RX ORDER — PREDNISONE 10 MG/1
TABLET ORAL
COMMUNITY
Start: 2023-11-28

## 2024-01-30 RX ORDER — LEUCOVORIN CALCIUM 15 MG/1
TABLET ORAL
COMMUNITY
Start: 2020-07-02

## 2024-01-30 RX ORDER — DOXYCYCLINE 100 MG/1
CAPSULE ORAL
COMMUNITY
Start: 2020-07-02

## 2024-01-30 RX ORDER — ATORVASTATIN CALCIUM 20 MG/1
TABLET, FILM COATED ORAL
COMMUNITY
Start: 2019-04-12

## 2024-01-30 RX ORDER — METHOTREXATE 2.5 MG/1
TABLET ORAL
COMMUNITY
Start: 2020-07-02

## 2024-01-30 RX ORDER — LEVOTHYROXINE SODIUM 100 UG/1
TABLET ORAL
COMMUNITY
Start: 2019-04-12

## 2024-01-30 RX ORDER — ASPIRIN 81 MG/1
TABLET ORAL
COMMUNITY
Start: 2019-04-12

## 2024-01-30 RX ORDER — GABAPENTIN 100 MG/1
CAPSULE ORAL
COMMUNITY
Start: 2023-11-13

## 2024-01-30 RX ORDER — METOPROLOL TARTRATE 100 MG/1
TABLET ORAL
COMMUNITY
Start: 2019-04-12

## 2024-01-31 ENCOUNTER — TELEMEDICINE (OUTPATIENT)
Dept: VASCULAR SURGERY | Facility: HOSPITAL | Age: 69
End: 2024-01-31
Payer: MEDICARE

## 2024-01-31 DIAGNOSIS — I72.4 POPLITEAL ARTERY ANEURYSM (CMS-HCC): Primary | ICD-10-CM

## 2024-01-31 PROCEDURE — 1126F AMNT PAIN NOTED NONE PRSNT: CPT | Performed by: NURSE PRACTITIONER

## 2024-01-31 PROCEDURE — 99213 OFFICE O/P EST LOW 20 MIN: CPT | Performed by: NURSE PRACTITIONER

## 2024-01-31 NOTE — PROGRESS NOTES
Vascular Surgery Clinic Note    CC: FUV left popliteal artery aneurysm    History Of Present Illness:   Harvey Solis is a 68 y.o. male here for routine follow up.  He underwent a left popliteal artery aneurysm repair by means of bypass grafting with PTFE in February 2019.  He denies claudication symptoms, rest pain or tissue loss.  He does not smoke.    Recent SYLVESTER is 1.19 on the right and 1.26 on the left.  Graft duplex demonstrates widely patent bypass graft.    He denies amaurosis fugax or TIA symptoms.       Medical History:  Patient Active Problem List   Diagnosis    Calculus of kidney    Depressive disorder    Hypertension    Pain of hand    Popliteal artery aneurysm (CMS/HCC)    Pure hypercholesterolemia        SH:    Social Determinants of Health     Tobacco Use: Not on file   Alcohol Use: Not on file   Financial Resource Strain: Not on file   Food Insecurity: Not on file   Transportation Needs: Not on file   Physical Activity: Not on file   Stress: Not on file   Social Connections: Not on file   Intimate Partner Violence: Not on file   Depression: Not on file   Housing Stability: Not on file   Utilities: Not on file   Digital Equity: Not on file        FH:  No family history on file.     Allergies:   No Known Allergies    ROS:  All systems were reviewed and noted to be negative, other than described above.     Objective:  Last Recorded Vitals  There were no vitals taken for this visit.    Meds:   Current Outpatient Medications   Medication Instructions    acetaminophen (Tylenol) 325 mg tablet oral, Every 6 hours    amLODIPine (Norvasc) 5 mg tablet oral    aspirin 81 mg EC tablet oral    atorvastatin (Lipitor) 20 mg tablet oral    docusate sodium (Colace) 100 mg capsule oral, 2 times daily    doxycycline (Vibramycin) 100 mg capsule oral    famotidine (Pepcid) 10 mg tablet oral    folic acid (FOLVITE) 1 mg, oral    gabapentin (Neurontin) 100 mg capsule     Jantoven 4 mg tablet     leucovorin (Wellcovorin) 15 mg  tablet oral    levothyroxine (Synthroid, Levoxyl) 100 mcg tablet oral    methotrexate (Trexall) 2.5 mg tablet oral    metoprolol tartrate (Lopressor) 100 mg tablet oral    predniSONE (Deltasone) 10 mg tablet     sertraline (Zoloft) 100 mg tablet oral    tiZANidine (Zanaflex) 4 mg tablet oral       Exam:  Unable to obtain - virtual visit.     Imaging Reviewed:  Vascular US ankle brachial index (SYLVESTER) without exercise 01/24/2024    [place] [address]  Tel [phone] and Fax [phone]      Vascular Lab Report  [facility] US ANKLE BRACHIAL INDEX (SYLVESTER) WITHOUT EXERCISE      Patient Name:     LEONIDAS Marin Physician: [id number] Tri Bolden MD  Study Date:       1/24/2024           Ordering           [id number] GARY S CHO  Physician:  MRN/PID:          [id number]            Technologist:      Navdeep Bhatia RVT  Accession#:       [id number]        Technologist 2:  Date of           1955 / 68      Encounter#:        [id number]  Birth/Age:        years  Gender:           M  Admission Status: Outpatient          Location           [facility]  Performed:      Diagnosis/ICD: Peripheral vascular disease, unspecified-I73.9  Indication:    Peripheral vascular disease  CPT Codes:     85938 Peripheral artery SYLVESTER Only      CONCLUSIONS:  Right Lower PVR: No evidence of arterial occlusive disease in the right lower extremity at rest. Normal digital perfusion noted. Biphasic flow is noted in the right posterior tibial artery. Triphasic flow is noted in the right common femoral artery and right dorsalis pedis artery.  Left Lower PVR: No evidence of arterial occlusive disease in the left lower extremity at rest. Normal digital perfusion noted. Triphasic flow is noted in the left common femoral artery, left posterior tibial artery and left dorsalis pedis artery.    Comparison:  Compared with study from 1/18/2023, no significant change.    Imaging & Doppler  Findings:    RIGHT Lower PVR                Pressures Ratios  Right Posterior Tibial (Ankle) 159 mmHg  1.17  Right Dorsalis Pedis (Ankle)   162 mmHg  1.19  Right Digit (Great Toe)        143 mmHg  1.05        LEFT Lower PVR                Pressures Ratios  Left Posterior Tibial (Ankle) 171 mmHg  1.26  Left Dorsalis Pedis (Ankle)   155 mmHg  1.14  Left Digit (Great Toe)        158 mmHg  1.16      Right     Left  Brachial Pressure 136 mmHg 135 mmHg        [id number] Tri Bolden MD  Electronically signed by [id number] Tri Bolden MD on 1/24/2024 at 1:38:08 PM        ** Final **     Vascular US axillary-femoral or bypass graft complete bilateral 01/24/2024    [place] [address]  Tel [phone] and Fax [phone]      Vascular Lab Report  [facility] US AXILLARY-FEMORAL OR BYPASS GRAFT COMPLETE BILATERAL      Patient Name:      LEONIDAS Marin Physician:  [id number] Maynor Mckeon MD  Study Date:        1/24/2024           Ordering Physician: [id number] LEVI  MRN/PID:           [id number]            Technologist:       Darcy Longoria T  Accession#:        [id number]        Technologist 2:  Date of Birth/Age: 1955 / 68      Encounter#:         [id number]  years  Gender:            M  Admission Status:  Outpatient          Location Performed: [facility]      Diagnosis/ICD: Peripheral vascular disease, unspecified-I73.9  CPT Codes:     00357 Peripheral artery Lower arterial Duplex BPG      Patient History LE Bypass graft. Hx of previous vasc surg/stent.      CONCLUSIONS:  Left Lower Arterial: There is no evidence of popliteal artery aneurysm.    Left popliteal artery AP measurements:    prox 12.78mm  mid 10.01mm  distal 8.6mm.  Left Bypass Graft: The left femoral to PTA bypass graft. The bypass is constructed of PTFE graft.    Imaging & Doppler Findings:    Bypass Graft Surveillance: Left Fem to PTA  Inflow Artery  61 cm/s  Prox Anast     88  cm/s  Prox Graft     65 cm/s  Mid Graft      42 cm/s  Distal Graft   39 cm/s  Distal Anast   40 cm/s  Outflow Artery 53 cm/s      Right                    Left  PSV                     PSV  94 cm/s        EIA  71 cm/s        CFA  51 cm/s Profunda Proximal  69 cm/s   SFA Proximal  67 cm/s      SFA Mid  49 cm/s    SFA Distal  57 cm/s     Popliteal  63 cm/s    DIVYA Distal  96 cm/s  Peroneal Distal  37 cm/s   PTA Proximal  46 cm/s      PTA Mid  34 cm/s    PTA Distal      [id number] Maynor Mckeon MD  Electronically signed by [id number] Maynor Mckeon MD on 1/24/2024 at 4:54:56 PM        ** Final **      Assessment & Plan:  1. Popliteal artery aneurysm (CMS/HCC)  Vascular US ankle brachial index (SYLVESTER) without exercise    Vascular US Lower Extremity Arterial Duplex With Graft Left With SYLVESTER        Status post left leg bypass for left popliteal aneurysm repair.   - continue aspirin & statin   - RTC in 1 year with vascular testing     Follow-up:  1 year    Alanis Harris, APRN-CNP

## 2024-02-16 ENCOUNTER — HOSPITAL ENCOUNTER (OUTPATIENT)
Dept: HOSPITAL 100 - MFPLAB | Age: 69
Discharge: HOME | End: 2024-02-16
Payer: MEDICARE

## 2024-02-16 DIAGNOSIS — Z12.5: Primary | ICD-10-CM

## 2024-02-16 LAB — PSA,TOTAL - ANNUAL SCREEN: 1.87 NG/ML (ref 0–4)

## 2024-02-16 PROCEDURE — G0103 PSA SCREENING: HCPCS

## 2024-02-16 PROCEDURE — 36415 COLL VENOUS BLD VENIPUNCTURE: CPT

## 2024-02-16 PROCEDURE — 84153 ASSAY OF PSA TOTAL: CPT

## 2024-02-22 ENCOUNTER — HOSPITAL ENCOUNTER (OUTPATIENT)
Dept: HOSPITAL 100 - LAB | Age: 69
LOS: 7 days | Discharge: HOME | End: 2024-02-29
Payer: MEDICARE

## 2024-02-22 DIAGNOSIS — L40.59: Primary | ICD-10-CM

## 2024-02-22 DIAGNOSIS — Z79.899: ICD-10-CM

## 2024-02-22 LAB
ALANINE AMINOTRANSFER ALT/SGPT: 20 U/L (ref 16–61)
ALBUMIN SERPL-MCNC: 4 G/DL (ref 3.2–5)
ALKALINE PHOSPHATASE: 105 U/L (ref 45–117)
ANION GAP: 1 (ref 5–15)
AST(SGOT): 24 U/L (ref 15–37)
BUN SERPL-MCNC: 13 MG/DL (ref 7–18)
BUN/CREAT RATIO: 14.5 RATIO (ref 10–20)
CALCIUM SERPL-MCNC: 8.8 MG/DL (ref 8.5–10.1)
CARBON DIOXIDE: 28 MMOL/L (ref 21–32)
CHLORIDE: 111 MMOL/L (ref 98–107)
DEPRECATED RDW RBC: 54.5 FL (ref 35.1–43.9)
ERYTHROCYTE [DISTWIDTH] IN BLOOD: 16.9 % (ref 11.6–14.6)
EST GLOM FILT RATE - AFR AMER: 108 ML/MIN (ref 60–?)
GLOBULIN: 3.2 G/DL (ref 2.2–4.2)
GLUCOSE: 102 MG/DL (ref 74–106)
HCT VFR BLD AUTO: 39.4 % (ref 40–54)
HGB BLD-MCNC: 12.8 G/DL (ref 13–16.5)
IMMATURE GRANULOCYTES COUNT: 0.04 X10^3/UL (ref 0–0)
MCV RBC: 88.9 FL (ref 80–94)
MEAN CORP HGB CONC: 32.5 G/DL (ref 32–36)
MEAN PLATELET VOL.: 11 FL (ref 6.2–12)
NRBC FLAGGED BY ANALYZER: 0 % (ref 0–5)
PLATELET # BLD: 190 K/MM3 (ref 150–450)
POTASSIUM: 3.9 MMOL/L (ref 3.5–5.1)
RBC # BLD AUTO: 4.43 M/MM3 (ref 4.6–6.2)
WBC # BLD AUTO: 7.8 K/MM3 (ref 4.4–11)

## 2024-02-22 PROCEDURE — 80053 COMPREHEN METABOLIC PANEL: CPT

## 2024-02-22 PROCEDURE — 36415 COLL VENOUS BLD VENIPUNCTURE: CPT

## 2024-02-22 PROCEDURE — 85025 COMPLETE CBC W/AUTO DIFF WBC: CPT

## 2024-04-01 ENCOUNTER — HOSPITAL ENCOUNTER (OUTPATIENT)
Age: 69
Discharge: HOME | End: 2024-04-01
Payer: MEDICARE

## 2024-04-01 DIAGNOSIS — F32.A: ICD-10-CM

## 2024-04-01 DIAGNOSIS — E03.9: ICD-10-CM

## 2024-04-01 DIAGNOSIS — G25.0: ICD-10-CM

## 2024-04-01 DIAGNOSIS — D64.9: Primary | ICD-10-CM

## 2024-04-01 DIAGNOSIS — E55.9: ICD-10-CM

## 2024-04-01 LAB
AMMONIA: 33 UMOL/L (ref 11–32)
FERRITIN SERPL-MCNC: 25 NG/ML (ref 26–388)
FOLATES, (FOLIC ACID): 21.6 NG/ML (ref 3.1–55.4)
IRON SPEC-MCNT: 52 UG/DL (ref 65–175)
VITAMIN B12: 286 PG/ML (ref 211–911)

## 2024-04-01 PROCEDURE — 83540 ASSAY OF IRON: CPT

## 2024-04-01 PROCEDURE — 84439 ASSAY OF FREE THYROXINE: CPT

## 2024-04-01 PROCEDURE — 82607 VITAMIN B-12: CPT

## 2024-04-01 PROCEDURE — 82728 ASSAY OF FERRITIN: CPT

## 2024-04-01 PROCEDURE — 36415 COLL VENOUS BLD VENIPUNCTURE: CPT

## 2024-04-01 PROCEDURE — 82746 ASSAY OF FOLIC ACID SERUM: CPT

## 2024-04-01 PROCEDURE — 82140 ASSAY OF AMMONIA: CPT

## 2024-04-01 PROCEDURE — 82652 VIT D 1 25-DIHYDROXY: CPT

## 2024-04-01 PROCEDURE — 84443 ASSAY THYROID STIM HORMONE: CPT

## 2024-04-03 LAB — 1,25(OH)2D3 SERPL-MCNC: 43.8 PG/ML (ref 24.8–81.5)

## 2024-05-02 ENCOUNTER — HOSPITAL ENCOUNTER (OUTPATIENT)
Dept: HOSPITAL 100 - MTLAB | Age: 69
Discharge: HOME | End: 2024-05-02
Payer: MEDICARE

## 2024-05-02 DIAGNOSIS — L40.59: Primary | ICD-10-CM

## 2024-05-02 DIAGNOSIS — Z79.899: ICD-10-CM

## 2024-05-02 DIAGNOSIS — M79.7: ICD-10-CM

## 2024-05-02 LAB
ALANINE AMINOTRANSFER ALT/SGPT: 21 U/L (ref 16–61)
ALBUMIN SERPL-MCNC: 3.9 G/DL (ref 3.2–5)
ALKALINE PHOSPHATASE: 132 U/L (ref 45–117)
ANION GAP: 7 (ref 5–15)
AST(SGOT): 22 U/L (ref 15–37)
BUN SERPL-MCNC: 13 MG/DL (ref 7–18)
BUN/CREAT RATIO: 12.9 RATIO (ref 10–20)
CALCIUM SERPL-MCNC: 9.1 MG/DL (ref 8.5–10.1)
CARBON DIOXIDE: 25 MMOL/L (ref 21–32)
CHLORIDE: 105 MMOL/L (ref 98–107)
DEPRECATED RDW RBC: 52.6 FL (ref 35.1–43.9)
ERYTHROCYTE [DISTWIDTH] IN BLOOD: 16 % (ref 11.6–14.6)
EST GLOM FILT RATE - AFR AMER: 94 ML/MIN (ref 60–?)
GLOBULIN: 3.5 G/DL (ref 2.2–4.2)
GLUCOSE: 91 MG/DL (ref 74–106)
HCT VFR BLD AUTO: 39.7 % (ref 40–54)
HGB BLD-MCNC: 12.7 G/DL (ref 13–16.5)
IMMATURE GRANULOCYTES COUNT: 0.05 X10^3/UL (ref 0–0)
MCV RBC: 89.6 FL (ref 80–94)
MEAN CORP HGB CONC: 32 G/DL (ref 32–36)
MEAN PLATELET VOL.: 11.7 FL (ref 6.2–12)
NRBC FLAGGED BY ANALYZER: 0 % (ref 0–5)
PLATELET # BLD: 234 K/MM3 (ref 150–450)
POTASSIUM: 4 MMOL/L (ref 3.5–5.1)
RBC # BLD AUTO: 4.43 M/MM3 (ref 4.6–6.2)
WBC # BLD AUTO: 11.5 K/MM3 (ref 4.4–11)

## 2024-05-02 PROCEDURE — 80053 COMPREHEN METABOLIC PANEL: CPT

## 2024-05-02 PROCEDURE — 85025 COMPLETE CBC W/AUTO DIFF WBC: CPT

## 2024-05-02 PROCEDURE — 36415 COLL VENOUS BLD VENIPUNCTURE: CPT

## 2024-07-26 ENCOUNTER — HOSPITAL ENCOUNTER (OUTPATIENT)
Dept: HOSPITAL 100 - MTLAB | Age: 69
Discharge: HOME | End: 2024-07-26
Payer: MEDICARE

## 2024-07-26 DIAGNOSIS — M79.7: ICD-10-CM

## 2024-07-26 DIAGNOSIS — L40.8: ICD-10-CM

## 2024-07-26 DIAGNOSIS — Z79.899: ICD-10-CM

## 2024-07-26 DIAGNOSIS — L40.59: Primary | ICD-10-CM

## 2024-07-26 LAB
ALANINE AMINOTRANSFER ALT/SGPT: 19 U/L (ref 16–61)
ALBUMIN SERPL-MCNC: 3.9 G/DL (ref 3.2–5)
ALKALINE PHOSPHATASE: 118 U/L (ref 45–117)
ANION GAP: 4 (ref 5–15)
AST(SGOT): 20 U/L (ref 15–37)
BUN SERPL-MCNC: 14 MG/DL (ref 7–18)
BUN/CREAT RATIO: 14.2 RATIO (ref 10–20)
CALCIUM SERPL-MCNC: 9.1 MG/DL (ref 8.5–10.1)
CARBON DIOXIDE: 29 MMOL/L (ref 21–32)
CHLORIDE: 106 MMOL/L (ref 98–107)
DEPRECATED RDW RBC: 50.3 FL (ref 35.1–43.9)
ERYTHROCYTE [DISTWIDTH] IN BLOOD: 15.5 % (ref 11.6–14.6)
EST GLOM FILT RATE - AFR AMER: 96 ML/MIN (ref 60–?)
GLOBULIN: 3.6 G/DL (ref 2.2–4.2)
GLUCOSE: 140 MG/DL (ref 74–106)
HCT VFR BLD AUTO: 41.9 % (ref 40–54)
HEMOGLOBIN: 12.8 G/DL (ref 13–16.5)
HGB BLD-MCNC: 12.8 G/DL (ref 13–16.5)
IMMATURE GRANULOCYTES COUNT: 0.02 X10^3/UL (ref 0–0)
MCV RBC: 89 FL (ref 80–94)
MEAN CORP HGB CONC: 30.5 G/DL (ref 32–36)
MEAN PLATELET VOL.: 11.7 FL (ref 6.2–12)
NRBC FLAGGED BY ANALYZER: 0 % (ref 0–5)
PLATELET # BLD: 198 K/MM3 (ref 150–450)
PLATELET COUNT: 198 K/MM3 (ref 150–450)
POTASSIUM: 3.6 MMOL/L (ref 3.5–5.1)
RBC # BLD AUTO: 4.71 M/MM3 (ref 4.6–6.2)
RBC DISTRIBUTION WIDTH CV: 15.5 % (ref 11.6–14.6)
RBC DISTRIBUTION WIDTH SD: 50.3 FL (ref 35.1–43.9)
WBC # BLD AUTO: 7.9 K/MM3 (ref 4.4–11)
WHITE BLOOD COUNT: 7.9 K/MM3 (ref 4.4–11)

## 2024-07-26 PROCEDURE — 85025 COMPLETE CBC W/AUTO DIFF WBC: CPT

## 2024-07-26 PROCEDURE — 80053 COMPREHEN METABOLIC PANEL: CPT

## 2024-07-26 PROCEDURE — 36415 COLL VENOUS BLD VENIPUNCTURE: CPT

## 2024-08-13 ENCOUNTER — HOSPITAL ENCOUNTER (OUTPATIENT)
Dept: HOSPITAL 100 - MTLAB | Age: 69
Discharge: HOME | End: 2024-08-13
Payer: MEDICARE

## 2024-08-13 DIAGNOSIS — M54.50: Primary | ICD-10-CM

## 2024-08-13 DIAGNOSIS — E78.00: ICD-10-CM

## 2024-08-13 LAB
CHOLEST SERPL-MCNC: 96 MG/DL
TRIGLYCERIDES: 109 MG/DL
VLDLC SERPL-MCNC: 22 MG/DL (ref 5–40)

## 2024-08-13 PROCEDURE — 72110 X-RAY EXAM L-2 SPINE 4/>VWS: CPT

## 2024-08-13 PROCEDURE — 36415 COLL VENOUS BLD VENIPUNCTURE: CPT

## 2024-08-13 PROCEDURE — 80061 LIPID PANEL: CPT

## 2024-08-21 ENCOUNTER — HOSPITAL ENCOUNTER (OUTPATIENT)
Dept: HOSPITAL 100 - PT | Age: 69
Discharge: HOME | End: 2024-08-21
Payer: MEDICARE

## 2024-08-21 DIAGNOSIS — M54.50: Primary | ICD-10-CM

## 2024-08-21 PROCEDURE — 97161 PT EVAL LOW COMPLEX 20 MIN: CPT

## 2024-10-25 ENCOUNTER — HOSPITAL ENCOUNTER (OUTPATIENT)
Dept: HOSPITAL 100 - MFPLAB | Age: 69
Discharge: HOME | End: 2024-10-25
Payer: MEDICARE

## 2024-10-25 DIAGNOSIS — Z79.899: ICD-10-CM

## 2024-10-25 DIAGNOSIS — L40.59: Primary | ICD-10-CM

## 2024-10-25 DIAGNOSIS — M79.7: ICD-10-CM

## 2024-10-25 LAB
ALANINE AMINOTRANSFER ALT/SGPT: 21 U/L (ref 16–61)
ALBUMIN SERPL-MCNC: 4 G/DL (ref 3.2–5)
ALKALINE PHOSPHATASE: 113 U/L (ref 45–117)
ANION GAP: 4 (ref 5–15)
AST(SGOT): 24 U/L (ref 15–37)
BUN SERPL-MCNC: 19 MG/DL (ref 7–18)
BUN/CREAT RATIO: 18.8 RATIO (ref 10–20)
CALCIUM SERPL-MCNC: 9.2 MG/DL (ref 8.5–10.1)
CARBON DIOXIDE: 28 MMOL/L (ref 21–32)
CHLORIDE: 107 MMOL/L (ref 98–107)
DEPRECATED RDW RBC: 55.9 FL (ref 35.1–43.9)
ERYTHROCYTE [DISTWIDTH] IN BLOOD: 17 % (ref 11.6–14.6)
EST GLOM FILT RATE - AFR AMER: 94 ML/MIN (ref 60–?)
GLOBULIN: 3.3 G/DL (ref 2.2–4.2)
GLUCOSE: 153 MG/DL (ref 74–106)
HCT VFR BLD AUTO: 41.1 % (ref 40–54)
HEMOGLOBIN: 13.2 G/DL (ref 13–16.5)
HGB BLD-MCNC: 13.2 G/DL (ref 13–16.5)
IMMATURE GRANULOCYTES COUNT: 0.02 X10^3/UL (ref 0–0)
MCV RBC: 90.7 FL (ref 80–94)
MEAN CORP HGB CONC: 32.1 G/DL (ref 32–36)
MEAN PLATELET VOL.: 11.9 FL (ref 6.2–12)
NRBC FLAGGED BY ANALYZER: 0.2 % (ref 0–5)
PLATELET # BLD: 199 K/MM3 (ref 150–450)
PLATELET COUNT: 199 K/MM3 (ref 150–450)
POTASSIUM: 3.9 MMOL/L (ref 3.5–5.1)
RBC # BLD AUTO: 4.53 M/MM3 (ref 4.6–6.2)
RBC DISTRIBUTION WIDTH CV: 17 % (ref 11.6–14.6)
RBC DISTRIBUTION WIDTH SD: 55.9 FL (ref 35.1–43.9)
WBC # BLD AUTO: 9.3 K/MM3 (ref 4.4–11)
WHITE BLOOD COUNT: 9.3 K/MM3 (ref 4.4–11)

## 2024-10-25 PROCEDURE — 80053 COMPREHEN METABOLIC PANEL: CPT

## 2024-10-25 PROCEDURE — 85025 COMPLETE CBC W/AUTO DIFF WBC: CPT

## 2024-10-25 PROCEDURE — 36415 COLL VENOUS BLD VENIPUNCTURE: CPT

## 2025-01-16 ENCOUNTER — HOSPITAL ENCOUNTER (OUTPATIENT)
Dept: HOSPITAL 100 - MFPLAB | Age: 70
Discharge: HOME | End: 2025-01-16
Payer: MEDICARE

## 2025-01-16 DIAGNOSIS — M79.7: ICD-10-CM

## 2025-01-16 DIAGNOSIS — L40.59: Primary | ICD-10-CM

## 2025-01-16 DIAGNOSIS — Z79.899: ICD-10-CM

## 2025-01-16 LAB
ALANINE AMINOTRANSFER ALT/SGPT: 24 U/L (ref 16–61)
ALBUMIN SERPL-MCNC: 4 G/DL (ref 3.2–5)
ALKALINE PHOSPHATASE: 103 U/L (ref 45–117)
ANION GAP: 7 (ref 5–15)
AST(SGOT): 24 U/L (ref 15–37)
BUN SERPL-MCNC: 13 MG/DL (ref 7–18)
BUN/CREAT RATIO: 13.4 RATIO (ref 10–20)
CALCIUM SERPL-MCNC: 9.2 MG/DL (ref 8.5–10.1)
CARBON DIOXIDE: 24 MMOL/L (ref 21–32)
CHLORIDE: 108 MMOL/L (ref 98–107)
DEPRECATED RDW RBC: 49.7 FL (ref 35.1–43.9)
ERYTHROCYTE [DISTWIDTH] IN BLOOD: 14.6 % (ref 11.6–14.6)
EST GLOM FILT RATE - AFR AMER: 98 ML/MIN (ref 60–?)
GLOBULIN: 3.4 G/DL (ref 2.2–4.2)
GLUCOSE: 110 MG/DL (ref 74–106)
HCT VFR BLD AUTO: 41.9 % (ref 40–54)
HEMOGLOBIN: 13.6 G/DL (ref 13–16.5)
HGB BLD-MCNC: 13.6 G/DL (ref 13–16.5)
IMMATURE GRANULOCYTES COUNT: 0.02 X10^3/UL (ref 0–0)
MCV RBC: 92.5 FL (ref 80–94)
MEAN CORP HGB CONC: 32.5 G/DL (ref 32–36)
MEAN PLATELET VOL.: 11.2 FL (ref 6.2–12)
NRBC FLAGGED BY ANALYZER: 0 % (ref 0–5)
PLATELET # BLD: 212 K/MM3 (ref 150–450)
PLATELET COUNT: 212 K/MM3 (ref 150–450)
POTASSIUM: 4 MMOL/L (ref 3.5–5.1)
RBC # BLD AUTO: 4.53 M/MM3 (ref 4.6–6.2)
RBC DISTRIBUTION WIDTH CV: 14.6 % (ref 11.6–14.6)
RBC DISTRIBUTION WIDTH SD: 49.7 FL (ref 35.1–43.9)
WBC # BLD AUTO: 8.9 K/MM3 (ref 4.4–11)
WHITE BLOOD COUNT: 8.9 K/MM3 (ref 4.4–11)

## 2025-01-16 PROCEDURE — 80053 COMPREHEN METABOLIC PANEL: CPT

## 2025-01-16 PROCEDURE — 36415 COLL VENOUS BLD VENIPUNCTURE: CPT

## 2025-01-16 PROCEDURE — 85025 COMPLETE CBC W/AUTO DIFF WBC: CPT

## 2025-01-29 ENCOUNTER — OFFICE VISIT (OUTPATIENT)
Dept: VASCULAR SURGERY | Facility: HOSPITAL | Age: 70
End: 2025-01-29
Payer: MEDICARE

## 2025-01-29 ENCOUNTER — HOSPITAL ENCOUNTER (OUTPATIENT)
Dept: VASCULAR MEDICINE | Facility: HOSPITAL | Age: 70
Discharge: HOME | End: 2025-01-29
Payer: MEDICARE

## 2025-01-29 VITALS
OXYGEN SATURATION: 95 % | HEIGHT: 71 IN | HEART RATE: 61 BPM | SYSTOLIC BLOOD PRESSURE: 116 MMHG | WEIGHT: 218.2 LBS | DIASTOLIC BLOOD PRESSURE: 70 MMHG | BODY MASS INDEX: 30.55 KG/M2

## 2025-01-29 DIAGNOSIS — I72.4 POPLITEAL ARTERY ANEURYSM (CMS-HCC): ICD-10-CM

## 2025-01-29 DIAGNOSIS — I73.9 PERIPHERAL VASCULAR DISEASE, UNSPECIFIED (CMS-HCC): ICD-10-CM

## 2025-01-29 DIAGNOSIS — I72.4 POPLITEAL ARTERY ANEURYSM (CMS-HCC): Primary | ICD-10-CM

## 2025-01-29 PROCEDURE — 1036F TOBACCO NON-USER: CPT | Performed by: NURSE PRACTITIONER

## 2025-01-29 PROCEDURE — 93922 UPR/L XTREMITY ART 2 LEVELS: CPT

## 2025-01-29 PROCEDURE — 99213 OFFICE O/P EST LOW 20 MIN: CPT | Performed by: NURSE PRACTITIONER

## 2025-01-29 PROCEDURE — 3078F DIAST BP <80 MM HG: CPT | Performed by: NURSE PRACTITIONER

## 2025-01-29 PROCEDURE — 93922 UPR/L XTREMITY ART 2 LEVELS: CPT | Performed by: SURGERY

## 2025-01-29 PROCEDURE — 1159F MED LIST DOCD IN RCRD: CPT | Performed by: NURSE PRACTITIONER

## 2025-01-29 PROCEDURE — 1126F AMNT PAIN NOTED NONE PRSNT: CPT | Performed by: NURSE PRACTITIONER

## 2025-01-29 PROCEDURE — 3008F BODY MASS INDEX DOCD: CPT | Performed by: NURSE PRACTITIONER

## 2025-01-29 PROCEDURE — 93926 LOWER EXTREMITY STUDY: CPT | Performed by: SURGERY

## 2025-01-29 PROCEDURE — 3074F SYST BP LT 130 MM HG: CPT | Performed by: NURSE PRACTITIONER

## 2025-01-29 RX ORDER — HYDROXYZINE HYDROCHLORIDE 25 MG/1
25 TABLET, FILM COATED ORAL 3 TIMES DAILY
COMMUNITY

## 2025-01-29 RX ORDER — DULOXETIN HYDROCHLORIDE 60 MG/1
60 CAPSULE, DELAYED RELEASE ORAL DAILY
COMMUNITY

## 2025-01-29 ASSESSMENT — COLUMBIA-SUICIDE SEVERITY RATING SCALE - C-SSRS
1. IN THE PAST MONTH, HAVE YOU WISHED YOU WERE DEAD OR WISHED YOU COULD GO TO SLEEP AND NOT WAKE UP?: NO
2. HAVE YOU ACTUALLY HAD ANY THOUGHTS OF KILLING YOURSELF?: NO
6. HAVE YOU EVER DONE ANYTHING, STARTED TO DO ANYTHING, OR PREPARED TO DO ANYTHING TO END YOUR LIFE?: NO

## 2025-01-29 ASSESSMENT — ENCOUNTER SYMPTOMS
DEPRESSION: 0
OCCASIONAL FEELINGS OF UNSTEADINESS: 0
LOSS OF SENSATION IN FEET: 0

## 2025-01-29 ASSESSMENT — PATIENT HEALTH QUESTIONNAIRE - PHQ9
2. FEELING DOWN, DEPRESSED OR HOPELESS: NOT AT ALL
1. LITTLE INTEREST OR PLEASURE IN DOING THINGS: NOT AT ALL
SUM OF ALL RESPONSES TO PHQ9 QUESTIONS 1 AND 2: 0

## 2025-01-29 ASSESSMENT — PAIN SCALES - GENERAL: PAINLEVEL_OUTOF10: 0-NO PAIN

## 2025-01-29 NOTE — PROGRESS NOTES
"Vascular Surgery Clinic Note    CC: FUV popliteal artery aneurysm     History Of Present Illness:   Harvey Solis is a 69 y.o. male here for routine follow up. He underwent a left AK pop-BK pop bypass grafting using PTFE conduit for left popliteal aneurysm 2019 by Dr. Olvin Perez. He currently denies claudication symptoms, rest pain or tissue loss. He does not smoke.     He denies amaurosis fugax or TIA symptoms. He denies any known family history of aneurysmal disease. He has no DM. He does not smoke.     Medical History:  Patient Active Problem List   Diagnosis    Calculus of kidney    Depressive disorder    Hypertension    Pain of hand    Popliteal artery aneurysm (CMS-HCC)    Pure hypercholesterolemia        SH:    Social Drivers of Health     Tobacco Use: Medium Risk (1/29/2025)    Patient History     Smoking Tobacco Use: Former     Smokeless Tobacco Use: Never     Passive Exposure: Not on file   Alcohol Use: Not on file   Financial Resource Strain: Not on file   Food Insecurity: Not on file   Transportation Needs: Not on file   Physical Activity: Not on file   Stress: Not on file   Social Connections: Not on file   Intimate Partner Violence: Not on file   Depression: Not at risk (1/29/2025)    PHQ-2     PHQ-2 Score: 0   Housing Stability: Not on file   Utilities: Not on file   Digital Equity: Not on file   Health Literacy: Not on file        FH:  No family history on file.     Allergies:   No Known Allergies    ROS:  All systems were reviewed and noted to be negative, other than described above.     Objective:  Last Recorded Vitals  Blood pressure 116/70, pulse 61, height 1.803 m (5' 11\"), weight 99 kg (218 lb 3.2 oz), SpO2 95%.    Meds:   Current Outpatient Medications   Medication Instructions    amLODIPine (Norvasc) 5 mg tablet Take by mouth.    aspirin 81 mg EC tablet Take by mouth.    atorvastatin (Lipitor) 20 mg tablet Take by mouth.    DULoxetine (CYMBALTA) 60 mg, Daily    famotidine (Pepcid) 10 mg tablet " Take by mouth.    folic acid (FOLVITE) 1 mg    gabapentin (Neurontin) 100 mg capsule     hydrOXYzine HCL (ATARAX) 25 mg, 3 times daily    Jantoven 4 mg tablet     leucovorin (Wellcovorin) 15 mg tablet Take by mouth.    levothyroxine (Synthroid, Levoxyl) 100 mcg tablet Take by mouth.    methotrexate (Trexall) 2.5 mg tablet Take by mouth.    metoprolol tartrate (Lopressor) 100 mg tablet Take by mouth.    predniSONE (Deltasone) 10 mg tablet        Exam:  Constitutional: Well appearing, NAD   PSYCH: Appropriate mood and affect  Eyes: Sclera clear   Neck: Supple   CV: No tachycardia   RESP: Unlabored breathing   GI: Soft, nontender, non-distended.   SKIN: No lesions  NEURO: No focal deficits noted.    EXTREMITIES: Warm & well perfused. No leg edema. No evidence of arterial ischemia.   PULSES: palpable femoral and DP pulse bilaterally     Imaging Reviewed:  Vascular US ankle brachial index (SYLVESTER) without exercise 01/29/2025 (Preliminary)  This result has not been signed. Information might be incomplete.    Narrative  Preliminary Cardiology Report    [place] [address]  Tel [phone] and Fax [phone]      Preliminary Vascular Lab Report    [facility] US ANKLE BRACHIAL INDEX (SYLVESTER) WITHOUT EXERCISE      Patient Name:      LEONIDAS Marin Physician:  [id number] Reina Yang MD  Study Date:        1/29/2025    Ordering Physician: [id number] ZAIRE SAMANIEGO  MRN/PID:           [id number]     Technologist:       Shagufta Cheatham T  Accession#:        [id number] Technologist 2:  Date of Birth/Age: 1955    Encounter#:         [id number]  Gender:            M  Admission Status:  Outpatient   Location Performed: [facility]      Diagnosis/ICD: Peripheral vascular disease, unspecified-I73.9  Procedure/CPT: 83100 Peripheral artery SYLVESTER Only      PRELIMINARY CONCLUSIONS:  Right Lower PVR: No evidence of arterial occlusive disease in the right lower extremity at rest. Normal digital  perfusion noted. Multiphasic flow is noted in the right common femoral artery, right posterior tibial artery and right dorsalis pedis artery.  Left Lower PVR: No evidence of arterial occlusive disease in the left lower extremity at rest. Normal digital perfusion noted. Multiphasic flow is noted in the left common femoral artery, left posterior tibial artery and left dorsalis pedis artery.    Imaging & Doppler Findings:    RIGHT Lower PVR                Pressures Ratios  Right Posterior Tibial (Ankle) 139 mmHg  0.95  Right Dorsalis Pedis (Ankle)   161 mmHg  1.10  Right Digit (Great Toe)        123 mmHg  0.84        LEFT Lower PVR                Pressures Ratios  Left Posterior Tibial (Ankle) 163 mmHg  1.11  Left Dorsalis Pedis (Ankle)   145 mmHg  0.99  Left Digit (Great Toe)        108 mmHg  0.73        Right  Brachial Pressure 147 mmHg          VASCULAR PRELIMINARY REPORT  completed by Shagufta Cheatham RVT on 1/29/2025 at 12:37:07 PM        ** Final **      Vascular US Lower Extremity Arterial Duplex With Graft Left With SYLVESTER 01/29/2025 (Preliminary)  This result has not been signed. Information might be incomplete.    Narrative  Preliminary Cardiology Report    [place] [address]  Tel [phone] and Fax [phone]      Preliminary Vascular Lab Report    VASC US LOWER EXTREMITY ARTERIAL DUPLEX W/ GRAFT LEFT W/ SYLVESTER      Patient Name:      LEONIDAS Marin Physician:  [id number] Reina Yang MD  Study Date:        1/29/2025    Ordering Physician: [id number] ZAIRE SAMANIEGO  MRN/PID:           [id number]     Technologist:       Shagufta Cheatham RVT  Accession#:        [id number] Technologist 2:  Date of Birth/Age: 1955    Encounter#:         [id number]  Gender:            M  Admission Status:  Outpatient   Location Performed: [facility]      Diagnosis/ICD: Peripheral vascular disease, unspecified-I73.9  Procedure/CPT: 63627 Peripheral artery Lower arterial Duplex BPG  limited      PRELIMINARY CONCLUSIONS:  Left Bypass Graft: The left femoral to PTA bypass graft appears widely patent. The bypass is constructed of PTFE graft.  Dist SFA/prox Pop A inflow art  Prox PTA outlfow art.    Imaging & Doppler Findings:    Bypass Graft Surveillance:  Inflow Artery    38 cm/s  Prox Anast       42 cm/s  Prox Graft       60 cm/s  Prox/Mid Graft   67 cm/s  Mid Graft        123 cm/s  Mid/Distal Graft 59 cm/s  Distal Graft     53 cm/s  Distal Anast     38 cm/s  Outflow Artery   49 cm/s      Rt Diameter  9.2 cm    SFA Prox  7.9 cm    SFA Mid  7.6 cm    SFA Dist        VASCULAR PRELIMINARY REPORT  completed by Shagufta Cheatham RVT on 1/29/2025 at 12:34:40 PM        ** Final **      Assessment & Plan:  1. Popliteal artery aneurysm (CMS-HCC)  Vascular US ankle brachial index (SYLVESTER) without exercise    Vascular US Lower Extremity Arterial Duplex Bilateral        Status post left AK pop-BK pop bypass grafting using PTFE conduit for left popliteal aneurysm 2019 by Dr. Olvin Perez.   SYLVESTER is 1.1 on the right and 1.11 on the left.   Duplex demonstrates widely patent graft.   CTA from 2019 showed no AAA.   Dupex from 2023 showed no right popliteal artery aneurysm.   Continue aspirin, statin & warfarin (graft patency)   RTC in 1 year with vascular testing      Alanis Harris, APRN-CNP

## 2025-04-08 ENCOUNTER — HOSPITAL ENCOUNTER (OUTPATIENT)
Age: 70
Discharge: HOME | End: 2025-04-08
Payer: MEDICARE

## 2025-04-08 DIAGNOSIS — M79.7: ICD-10-CM

## 2025-04-08 DIAGNOSIS — L40.59: Primary | ICD-10-CM

## 2025-04-08 DIAGNOSIS — Z79.899: ICD-10-CM

## 2025-04-08 LAB
ALBUMIN SERPL-MCNC: 4.4 G/DL (ref 3.4–4.8)
ALP SERPL-CCNC: 107 U/L (ref 40–129)
ALT SERPL-CCNC: 15 U/L (ref ?–46)
ANION GAP SERPL CALC-SCNC: 11 MMOL/L (ref 5–15)
AST(SGOT): 22 U/L (ref ?–37)
BUN SERPL-MCNC: 15 MG/DL (ref 4–19)
BUN/CREAT SERPL: 14.9 RATIO (ref 10–20)
CALCIUM SERPL-MCNC: 9.3 MG/DL (ref 7.6–11)
CHLORIDE: 103 MMOL/L (ref 98–108)
CO2 BLDCV-SCNC: 26.7 MMOL/L (ref 21–32)
DEPRECATED RDW RBC: 51.1 FL (ref 35.1–43.9)
ERYTHROCYTE [DISTWIDTH] IN BLOOD: 14.8 % (ref 11.6–14.6)
GLOBULIN: 2.8 G/DL (ref 2.2–4.2)
GLUCOSE SERPL-MCNC: 108 MG/DL (ref 70–99)
HCT VFR BLD AUTO: 42.8 % (ref 40–54)
HGB BLD-MCNC: 14.3 G/DL (ref 13–16.5)
MCV RBC: 95.1 FL (ref 80–94)
MEAN CORP HGB CONC: 33.4 G/DL (ref 32–36)
MEAN PLATELET VOL.: 11.4 FL (ref 6.2–12)
NRBC FLAGGED BY ANALYZER: 0 % (ref 0–5)
PLATELET # BLD: 206 K/MM3 (ref 150–450)
POTASSIUM SPEC-MCNC: 3.9 MMOL/L (ref 3.3–5.1)
WBC # BLD AUTO: 9.9 K/MM3 (ref 4.4–11)

## 2025-04-08 PROCEDURE — 80053 COMPREHEN METABOLIC PANEL: CPT

## 2025-04-08 PROCEDURE — 85025 COMPLETE CBC W/AUTO DIFF WBC: CPT

## 2025-04-08 PROCEDURE — 36415 COLL VENOUS BLD VENIPUNCTURE: CPT

## 2025-07-02 ENCOUNTER — HOSPITAL ENCOUNTER (OUTPATIENT)
Dept: HOSPITAL 100 - MTLAB | Age: 70
Discharge: HOME | End: 2025-07-02
Payer: MEDICARE

## 2025-07-02 DIAGNOSIS — M79.7: ICD-10-CM

## 2025-07-02 DIAGNOSIS — L40.59: Primary | ICD-10-CM

## 2025-07-02 DIAGNOSIS — Z79.899: ICD-10-CM

## 2025-07-02 LAB
ABSOLUTE NEUTROPHIL COUNT: 6.9 X10^3/UL (ref 2–7.7)
ALBUMIN SERPL-MCNC: 4.5 G/DL (ref 3.4–4.8)
ALBUMIN/GLOB SERPL: 1.7 RATIO (ref 0.9–2.4)
ALP SERPL-CCNC: 100 U/L (ref 40–129)
ALT SERPL-CCNC: 18 U/L (ref ?–46)
ANION GAP SERPL CALC-SCNC: 11 MMOL/L (ref 5–15)
ANISOCYTOSIS BLD QL SMEAR: (no result)
AST(SGOT): 26 U/L (ref ?–37)
BASO STIPL BLD QL SMEAR: (no result)
BASOPHIL#: 0.05 X10^3/UL
BASOPHIL%: 0.5 % (ref 0–1)
BASOPHILS NFR SPEC MANUAL: (no result) % (ref 0–1)
BITE CELLS BLD QL SMEAR: (no result)
BUN SERPL-MCNC: 14 MG/DL (ref 4–19)
BUN/CREAT SERPL: 14.9 RATIO (ref 10–20)
BURR CELLS BLD QL SMEAR: (no result)
CALCIUM SERPL-MCNC: 9.5 MG/DL (ref 7.6–11)
CHLORIDE: 105 MMOL/L (ref 98–108)
CO2 BLDCV-SCNC: 25.6 MMOL/L (ref 21–32)
CREAT SERPL-MCNC: 0.94 MG/DL (ref 0.7–1.2)
DEPRECATED RDW RBC: 50.9 FL (ref 35.1–43.9)
DOHLE BOD BLD QL SMEAR: (no result)
EOSINOPHIL # BLD: 0.57 X10^3/UL
ERYTHROCYTE [DISTWIDTH] IN BLOOD: 14.7 % (ref 11.6–14.6)
GLOBULIN: 2.6 G/DL (ref 2.2–4.2)
GLUCOSE SERPL-MCNC: 101 MG/DL (ref 70–99)
HCT VFR BLD AUTO: 40.9 % (ref 40–54)
HGB BLD-MCNC: 13.7 G/DL (ref 13–16.5)
HOWELL-JOLLY BOD BLD QL SMEAR: (no result)
HYPOCHROMIA BLD QL: (no result)
IMM GRANULOCYTES NFR BLD AUTO: 0.3 % (ref 0–0.9)
IMMATURE GRANULOCYTES COUNT: 0.03 X10^3/UL (ref 0–0)
LYMPHOCYTES # SPEC AUTO: 2.24 X10^3/UL (ref 0.83–4.51)
LYMPHOCYTES # SPEC AUTO: 2.24 X10^3/UL (ref 0.83–4.51)
LYMPHOCYTES NFR SPEC AUTO: 21.9 % (ref 19–41)
Lab: 5.6 % (ref 0–5)
MACROCYTES BLD QL: (no result)
MANUAL DIF COMMENT BLD-IMP: (no result)
MCH RBC QN AUTO: 31.9 PG (ref 27–32)
MCV RBC: 95.3 FL (ref 80–94)
MEAN CORP HGB CONC: 33.5 G/DL (ref 32–36)
MEAN PLATELET VOL.: 11.1 FL (ref 6.2–12)
MONOCYTE#: 0.48 X10^3/UL
MONOCYTE%: 4.7 % (ref 0–10)
NEUTROPHIL #: 6.85 X10^3/UL (ref 2.7–7.7)
NEUTROPHILS NFR BLD AUTO: 67 % (ref 47–70)
NEUTS HYPERSEG # BLD: (no result) 10*3/UL
NRBC FLAGGED BY ANALYZER: 0 % (ref 0–5)
PLATELET # BLD: 193 K/MM3 (ref 150–450)
POLYCHROMASIA BLD QL SMEAR: (no result)
POTASSIUM SPEC-MCNC: 4 MMOL/L (ref 3.3–5.1)
PROTEIN, TOTAL: 7.1 G/DL (ref 5.9–8.4)
RBC # BLD AUTO: 4.29 M/MM3 (ref 4.6–6.2)
SODIUM LEVEL: 141 MMOL/L (ref 133–145)
TOTAL BILIRUBIN: 0.6 MG/DL (ref 0–1.3)
WBC # BLD AUTO: 10.2 K/MM3 (ref 4.4–11)
WBC NRBC COR # BLD: (no result) K/MM3 (ref 4.4–11)

## 2025-07-02 PROCEDURE — 85025 COMPLETE CBC W/AUTO DIFF WBC: CPT

## 2025-07-02 PROCEDURE — 80053 COMPREHEN METABOLIC PANEL: CPT

## 2025-07-02 PROCEDURE — 36415 COLL VENOUS BLD VENIPUNCTURE: CPT

## 2026-01-28 ENCOUNTER — APPOINTMENT (OUTPATIENT)
Dept: VASCULAR MEDICINE | Facility: HOSPITAL | Age: 71
End: 2026-01-28
Payer: MEDICARE